# Patient Record
Sex: FEMALE | Race: WHITE | Employment: FULL TIME | URBAN - METROPOLITAN AREA
[De-identification: names, ages, dates, MRNs, and addresses within clinical notes are randomized per-mention and may not be internally consistent; named-entity substitution may affect disease eponyms.]

---

## 2017-07-13 LAB — DIABETIC RETINOPATHY: NORMAL

## 2018-02-04 LAB
BUN BLDV-MCNC: 21 MG/DL
CALCIUM SERPL-MCNC: 9.5 MG/DL
CHLORIDE BLD-SCNC: 99 MMOL/L
CO2: 23 MMOL/L
CREAT SERPL-MCNC: 0.79 MG/DL
GFR CALCULATED: 79
GLUCOSE BLD-MCNC: 319 MG/DL
POTASSIUM SERPL-SCNC: 4 MMOL/L
SODIUM BLD-SCNC: 132 MMOL/L

## 2018-02-20 PROBLEM — M54.50 CHRONIC MIDLINE LOW BACK PAIN WITHOUT SCIATICA: Status: ACTIVE | Noted: 2017-02-02

## 2018-02-20 PROBLEM — G25.81 RESTLESS LEG SYNDROME: Status: ACTIVE | Noted: 2017-02-02

## 2018-02-20 PROBLEM — G89.29 CHRONIC MIDLINE LOW BACK PAIN WITHOUT SCIATICA: Status: ACTIVE | Noted: 2017-02-02

## 2018-02-20 PROBLEM — M79.18 MYOFASCIAL PAIN: Status: ACTIVE | Noted: 2017-02-02

## 2018-02-22 LAB
AVERAGE GLUCOSE: ABNORMAL
HBA1C MFR BLD: 8.4 %
T4 FREE: 1.01
TSH SERPL DL<=0.05 MIU/L-ACNC: 2.13 UIU/ML

## 2018-02-28 ENCOUNTER — OFFICE VISIT (OUTPATIENT)
Dept: ENDOCRINOLOGY | Age: 63
End: 2018-02-28

## 2018-02-28 VITALS
DIASTOLIC BLOOD PRESSURE: 59 MMHG | WEIGHT: 141 LBS | HEIGHT: 65 IN | BODY MASS INDEX: 23.49 KG/M2 | SYSTOLIC BLOOD PRESSURE: 102 MMHG | HEART RATE: 74 BPM

## 2018-02-28 DIAGNOSIS — E78.49 OTHER HYPERLIPIDEMIA: ICD-10-CM

## 2018-02-28 DIAGNOSIS — E53.8 VITAMIN B 12 DEFICIENCY: ICD-10-CM

## 2018-02-28 DIAGNOSIS — E55.9 VITAMIN D DEFICIENCY: ICD-10-CM

## 2018-02-28 DIAGNOSIS — E10.9 TYPE 1 DIABETES MELLITUS WITHOUT COMPLICATION (HCC): ICD-10-CM

## 2018-02-28 DIAGNOSIS — M79.7 FIBROMYALGIA: ICD-10-CM

## 2018-02-28 DIAGNOSIS — M19.90 ARTHRITIS: ICD-10-CM

## 2018-02-28 PROBLEM — E78.5 HYPERLIPIDEMIA: Status: ACTIVE | Noted: 2018-02-28

## 2018-02-28 PROCEDURE — 99215 OFFICE O/P EST HI 40 MIN: CPT | Performed by: INTERNAL MEDICINE

## 2018-02-28 ASSESSMENT — PATIENT HEALTH QUESTIONNAIRE - PHQ9
2. FEELING DOWN, DEPRESSED OR HOPELESS: 0
1. LITTLE INTEREST OR PLEASURE IN DOING THINGS: 0
SUM OF ALL RESPONSES TO PHQ9 QUESTIONS 1 & 2: 0
SUM OF ALL RESPONSES TO PHQ QUESTIONS 1-9: 0

## 2018-02-28 NOTE — PROGRESS NOTES
Maryana Sosa is a 70 y.o. female Pt is seen for follow up of Type 1 Diabetes . Pt was diagnosed with T1Dm in year 1996 she was initaily started on metformin but after a couple of months she was switched to insulin and on pump for years   She got diabetic education in the past .She feels she has been coumnting her carbs . She has hypoglycemia awareness and her threshold is in the 60's   She has Fibromyalgia and follows with dr Jessica Martinez   She has AAA and follows with Dr Judy Ashley     She has been taking Metformin twice a day and denies any stomach upset with it   Happy to be off pump and still sbnacks between meals which messes up control       INTERIM:    Diabetes   She presents for her follow-up diabetic visit. She has type 1 diabetes mellitus. No MedicAlert identification noted. The initial diagnosis of diabetes was made 23 years ago. Her disease course has been stable. Hypoglycemia symptoms include nervousness/anxiousness, speech difficulty, sweats and tremors. Pertinent negatives for diabetes include no foot ulcerations, no polydipsia, no polyphagia, no polyuria, no visual change, no weakness and no weight loss. There are no hypoglycemic complications. Symptoms are stable. Diabetic complications include peripheral neuropathy. Risk factors for coronary artery disease include diabetes mellitus, dyslipidemia and post-menopausal. Current diabetic treatment includes intensive insulin program. She is compliant with treatment most of the time. Her weight is stable. She is following a generally healthy and diabetic diet. Meal planning includes avoidance of concentrated sweets. She has had a previous visit with a dietitian. She participates in exercise weekly. There is no change in her home blood glucose trend. Her breakfast blood glucose is taken between 8-9 am. Her breakfast blood glucose range is generally 130-140 mg/dl.  Her lunch blood glucose is taken between 12-1 pm. Her lunch blood glucose range is generally 140-180 following:  Pulses: normal,   Pinprick: Intact  Proprioception: Intact  Vibration (128 Hz): Intact    No results found for: LABA1C      ASSESSMENT/PLAN:  TYPE 1 DIABETES WITH COMPLICATIONS --UNCONTROLLED 8.1>>7.3 > 7.9>.8.5>>7.9>>7.5>>8.4>>8.5 >>8.5>>8.0  Comprehensively reviewed bloodglucose records, pump download & discussed changes and implications   Reviewed ABC'S of diabetes management A1C <7 Blood pressure <130/80 and cholesterol   victoza gave extreme nausea so not taking it   She was on an insulin pump but is enjoying her. Without the pump and is not interested in going back right now. If she ever has to go back she thinks she would use the 670 G pump by [employer]   She is on lantus 16 units daily she will increase to 17 units and humalog as per CIR 7:1-- increase to  4:1 with dinner ---she will continue with metformin 500 mg bid   She will take 15 units on the day she exercises and non exercising days she will take 16 units of lantus and reduce on exercise days   Hypoglycemia protocol reviewed in detail with patient Patient was advised to carry glucose tablets and also have glucagon emergency kit. Provided with RX for glucagon.  -advised to have annual dilated eye exam normal in July 2015   -last microalb/creat ratio normal 3/2017 -  --eye exam no retinopathy no retinopathy July 2017   --feet exam examianed (Aug 2016 )she has bony spurs in the middle of plantar surface --she saw podiatry in sep 2016 foot exam today revealed normal exam --Vibration normal     --- Thyroid normal on palpation No discreet thyroid nodules identified on exam     Abdominal aortic aneurysm   Stable imaging in 2014   Last imaging in 2007   She is seeing vascular Dr Kang Class     -- stress test and visit with cards with no CAD     Low WBS   Follows with hematology     Hyperlipidemia   Stopped simva 20 mg and FLP tested again in 3 months which came LDL of 47 >>66  She is now 10 mg of simva ldl was 40   She has strong family hx of CAD despite good cholesterol. HYPERTENSION   Low to day she was advised to check at home    she denies feeling dizzy. She was advised if it is low to call back so we can adjust her blood pressure medication     Mitral valve prolapse     POSTMENOPAUSAL   Ob /gyn has been ordering dexa    she doesn't want to take medication for osteoporosis she does have family history of osteoporosis with her mom having osteoporosis. FIBROMYALGIA   Stable follows with Dr Rima Pederson   She had dexa scan done as per pt within last 1-2 years and is normal             Reviewed and/or ordered clinical lab results Yes  Reviewed and/or ordered radiology tests Yes  Reviewed and/or ordered other diagnostic tests Yes  Made a decision to obtain old records Yes  Reviewed and summarized old records Yes      Xochilt An was counseled regarding symptoms of current diagnosis, course and complications of disease if inadequately treated, side effects of medications, diagnosis, treatment options, and prognosis, risks, benefits, complications, and alternatives of treatment, labs, imaging and other studies and treatment targets and goals. She understands instructions and counseling. These diagnosis were discussed and reviewed with the patient including the advantages of drug therapy. She was counseled at this visit on the following: diabetes complication prevention and foot care. Return in about 3 months (around 5/28/2018).

## 2018-03-01 ASSESSMENT — ENCOUNTER SYMPTOMS: VISUAL CHANGE: 0

## 2018-03-15 ENCOUNTER — PATIENT MESSAGE (OUTPATIENT)
Dept: ENDOCRINOLOGY | Age: 63
End: 2018-03-15

## 2018-03-15 RX ORDER — BLOOD-GLUCOSE METER
EACH MISCELLANEOUS
COMMUNITY

## 2018-03-15 NOTE — TELEPHONE ENCOUNTER
From: Mirta Velasquez  To: Dillon Perla MD  Sent: 3/15/2018 11:39 AM EDT  Subject: Prescription Question    I only have 100 test strips. If it would be easier, you can send a prescription to [facility] for a new meter and then call [facility] to fill the prescription for test strips. I just don't want to run out.

## 2018-06-04 ENCOUNTER — OFFICE VISIT (OUTPATIENT)
Dept: ENDOCRINOLOGY | Age: 63
End: 2018-06-04

## 2018-06-04 VITALS
DIASTOLIC BLOOD PRESSURE: 64 MMHG | WEIGHT: 141.4 LBS | HEART RATE: 69 BPM | OXYGEN SATURATION: 99 % | BODY MASS INDEX: 23.56 KG/M2 | SYSTOLIC BLOOD PRESSURE: 126 MMHG | HEIGHT: 65 IN

## 2018-06-04 DIAGNOSIS — E10.42 TYPE 1 DIABETES MELLITUS WITH DIABETIC POLYNEUROPATHY (HCC): Primary | ICD-10-CM

## 2018-06-04 LAB
CREATININE, URINE: NORMAL
MICROALBUMIN/CREAT 24H UR: 8 MG/G{CREAT}
MICROALBUMIN/CREAT UR-RTO: NORMAL

## 2018-06-04 PROCEDURE — 99214 OFFICE O/P EST MOD 30 MIN: CPT | Performed by: INTERNAL MEDICINE

## 2018-06-04 ASSESSMENT — ENCOUNTER SYMPTOMS: VISUAL CHANGE: 0

## 2018-06-20 ENCOUNTER — TELEPHONE (OUTPATIENT)
Dept: ENDOCRINOLOGY | Age: 63
End: 2018-06-20

## 2018-07-05 ENCOUNTER — TELEPHONE (OUTPATIENT)
Dept: ENDOCRINOLOGY | Age: 63
End: 2018-07-05

## 2018-07-17 ENCOUNTER — TELEPHONE (OUTPATIENT)
Dept: ENDOCRINOLOGY | Age: 63
End: 2018-07-17

## 2018-07-17 NOTE — TELEPHONE ENCOUNTER
Dr. Michael Hogue reviewed diabetes log for 7/2 to 7/15. LMOV make the following changes reduce dinner bolus by 1 unit, need to know if she is using carbs : insulin ratio? What ratio, asked pt return phone call.

## 2018-08-09 ENCOUNTER — TELEPHONE (OUTPATIENT)
Dept: ENDOCRINOLOGY | Age: 63
End: 2018-08-09

## 2018-08-09 DIAGNOSIS — E16.2 HYPOGLYCEMIA: ICD-10-CM

## 2018-08-09 DIAGNOSIS — E10.10 TYPE 1 DIABETES MELLITUS WITH KETOACIDOSIS WITHOUT COMA (HCC): Primary | ICD-10-CM

## 2018-08-09 RX ORDER — FLASH GLUCOSE SENSOR
KIT MISCELLANEOUS
Qty: 3 EACH | Refills: 5 | Status: SHIPPED | OUTPATIENT
Start: 2018-08-09 | End: 2019-01-02 | Stop reason: SDUPTHER

## 2018-08-09 RX ORDER — FLASH GLUCOSE SENSOR
KIT MISCELLANEOUS
Qty: 1 DEVICE | Refills: 0 | Status: SHIPPED | OUTPATIENT
Start: 2018-08-09 | End: 2020-11-03

## 2018-08-09 NOTE — TELEPHONE ENCOUNTER
Discussed amado she is ot intersted   Discussed GLP-1 agonist she will think about   Will call in [employer]

## 2018-08-13 ENCOUNTER — TELEPHONE (OUTPATIENT)
Dept: ENDOCRINOLOGY | Age: 63
End: 2018-08-13

## 2018-08-13 NOTE — TELEPHONE ENCOUNTER
Fax from Bozena james for [facility].  Stating to increase the days supply to take full advantage of benefit plan

## 2018-08-16 ENCOUNTER — TELEPHONE (OUTPATIENT)
Dept: ENDOCRINOLOGY | Age: 63
End: 2018-08-16

## 2018-09-11 ENCOUNTER — TELEPHONE (OUTPATIENT)
Dept: ENDOCRINOLOGY | Age: 63
End: 2018-09-11

## 2018-09-25 ENCOUNTER — TELEPHONE (OUTPATIENT)
Dept: ENDOCRINOLOGY | Age: 63
End: 2018-09-25

## 2018-09-25 NOTE — TELEPHONE ENCOUNTER
Pt called and completely out of Humalog thought she had more. She normally uses [facility] but would like it to be sent to [facility] in [place].   # [phone]    FOV 10-8-18   FOV  6-4-18

## 2018-10-01 LAB — DIABETIC RETINOPATHY: NEGATIVE

## 2018-10-09 ENCOUNTER — TELEPHONE (OUTPATIENT)
Dept: ENDOCRINOLOGY | Age: 63
End: 2018-10-09

## 2018-10-12 LAB
ALBUMIN SERPL-MCNC: 4.2 G/DL
ALP BLD-CCNC: 53 U/L
ALT SERPL-CCNC: 11 U/L
ANION GAP SERPL CALCULATED.3IONS-SCNC: 8 MMOL/L
AST SERPL-CCNC: 17 U/L
AVERAGE GLUCOSE: ABNORMAL
BILIRUB SERPL-MCNC: 1 MG/DL (ref 0.1–1.4)
BUN BLDV-MCNC: 17 MG/DL
CALCIUM SERPL-MCNC: 9.5 MG/DL
CHLORIDE BLD-SCNC: 103 MMOL/L
CO2: 27 MMOL/L
CREAT SERPL-MCNC: 0.71 MG/DL
GFR CALCULATED: 89
GLUCOSE BLD-MCNC: 261 MG/DL
HBA1C MFR BLD: 7.1 %
POTASSIUM SERPL-SCNC: 5.1 MMOL/L
SODIUM BLD-SCNC: 138 MMOL/L
TOTAL PROTEIN: 6.4
TSH SERPL DL<=0.05 MIU/L-ACNC: 1.62 UIU/ML
VITAMIN D 25-HYDROXY: 43
VITAMIN D2, 25 HYDROXY: NORMAL
VITAMIN D3,25 HYDROXY: NORMAL

## 2018-10-18 ENCOUNTER — OFFICE VISIT (OUTPATIENT)
Dept: ENDOCRINOLOGY | Age: 63
End: 2018-10-18
Payer: COMMERCIAL

## 2018-10-18 ENCOUNTER — NURSE ONLY (OUTPATIENT)
Dept: ENDOCRINOLOGY | Age: 63
End: 2018-10-18

## 2018-10-18 VITALS
HEIGHT: 65 IN | DIASTOLIC BLOOD PRESSURE: 68 MMHG | WEIGHT: 144.8 LBS | BODY MASS INDEX: 24.12 KG/M2 | OXYGEN SATURATION: 99 % | SYSTOLIC BLOOD PRESSURE: 136 MMHG | HEART RATE: 69 BPM

## 2018-10-18 DIAGNOSIS — E10.9 TYPE 1 DIABETES MELLITUS WITHOUT COMPLICATION (HCC): Primary | ICD-10-CM

## 2018-10-18 DIAGNOSIS — I71.02 AORTIC DISSECTION, ABDOMINAL (HCC): ICD-10-CM

## 2018-10-18 PROCEDURE — 99214 OFFICE O/P EST MOD 30 MIN: CPT | Performed by: INTERNAL MEDICINE

## 2018-10-18 ASSESSMENT — ENCOUNTER SYMPTOMS: VISUAL CHANGE: 0

## 2018-10-18 NOTE — PROGRESS NOTES
ADENOIDECTOMY      TOOTH EXTRACTION      2018    TUBAL LIGATION       Social History     Social History    Marital status:      Spouse name: N/A    Number of children: N/A    Years of education: N/A     Occupational History    Not on file. Social History Main Topics    Smoking status: Never Smoker    Smokeless tobacco: Never Used    Alcohol use No    Drug use: No    Sexual activity: Not on file     Other Topics Concern    Not on file     Social History Narrative    ** Merged History Encounter **          Family History   Problem Relation Age of Onset    Heart Disease Mother     High Blood Pressure Mother     Arthritis Mother     Heart Attack Father     Breast Cancer Sister     High Blood Pressure Brother     Coronary Art Dis Mother     Osteoporosis Mother     Coronary Art Dis Father     High Blood Pressure Father     Emphysema Father     Cancer Sister     Stroke Maternal Grandmother      Current Outpatient Prescriptions   Medication Sig Dispense Refill    insulin lispro (HUMALOG) 100 UNIT/ML pen 8 units with breakfast and lunch, 4 units at dinner 5 pen 3    Continuous Blood Gluc  (FREESTYLE GREGG READER) YANA To check glycemic patterns 1 Device 0    Continuous Blood Gluc Sensor (FREESTYLE GREGG SENSOR SYSTEM) MISC To check glucose pattrens 3 each 5    ONETOUCH DELICA LANCETS FINE MISC Use 6 times per day.  600 each 0    glucose blood VI test strips (ONETOUCH VERIO) strip Check blood sugar 4-6 paul per day 540 each 3    Blood Glucose Monitoring Suppl (ONETOUCH VERIO FLEX SYSTEM) w/Device KIT by Does not apply route      aspirin 81 MG chewable tablet Take 81 mg by mouth      candesartan (ATACAND) 4 MG tablet Take by mouth      vitamin D (CHOLECALCIFEROL) 1000 units TABS tablet Take 1,000 Units by mouth      cyanocobalamin ([facility]S VITAMIN B12) 1000 MCG tablet Take 1,000 mcg by mouth      glucose (GLUTOSE) 40 % GEL Take 15 g by mouth      glucagon (GLUCAGON EMERGENCY)

## 2018-11-26 ENCOUNTER — TELEPHONE (OUTPATIENT)
Dept: ENDOCRINOLOGY | Age: 63
End: 2018-11-26

## 2018-12-12 ENCOUNTER — TELEPHONE (OUTPATIENT)
Dept: ENDOCRINOLOGY | Age: 63
End: 2018-12-12

## 2018-12-12 NOTE — TELEPHONE ENCOUNTER
Dr. Abhay Antoine reviewed diabetes log for 11/14 to 11/27. Patient aware to make the following changes reduce lunch bolus by 1 unit.

## 2019-01-02 RX ORDER — FLASH GLUCOSE SENSOR
KIT MISCELLANEOUS
Qty: 9 EACH | Refills: 5 | Status: SHIPPED | OUTPATIENT
Start: 2019-01-02 | End: 2020-11-03

## 2019-01-04 ENCOUNTER — TELEPHONE (OUTPATIENT)
Dept: ENDOCRINOLOGY | Age: 64
End: 2019-01-04

## 2019-01-17 ENCOUNTER — TELEPHONE (OUTPATIENT)
Dept: ENDOCRINOLOGY | Age: 64
End: 2019-01-17

## 2019-02-01 ENCOUNTER — TELEPHONE (OUTPATIENT)
Dept: ENDOCRINOLOGY | Age: 64
End: 2019-02-01

## 2019-02-07 ENCOUNTER — TELEPHONE (OUTPATIENT)
Dept: ENDOCRINOLOGY | Age: 64
End: 2019-02-07

## 2019-02-13 LAB
AVERAGE GLUCOSE: NORMAL
HBA1C MFR BLD: 8.3 %
TSH SERPL DL<=0.05 MIU/L-ACNC: 1.24 UIU/ML
VITAMIN D 25-HYDROXY: 35.6
VITAMIN D2, 25 HYDROXY: NORMAL
VITAMIN D3,25 HYDROXY: NORMAL

## 2019-02-18 ENCOUNTER — OFFICE VISIT (OUTPATIENT)
Dept: ENDOCRINOLOGY | Age: 64
End: 2019-02-18
Payer: COMMERCIAL

## 2019-02-18 VITALS
BODY MASS INDEX: 24.16 KG/M2 | SYSTOLIC BLOOD PRESSURE: 112 MMHG | OXYGEN SATURATION: 100 % | WEIGHT: 145 LBS | HEART RATE: 70 BPM | DIASTOLIC BLOOD PRESSURE: 60 MMHG | HEIGHT: 65 IN

## 2019-02-18 DIAGNOSIS — I10 ESSENTIAL HYPERTENSION: ICD-10-CM

## 2019-02-18 DIAGNOSIS — E78.2 MIXED HYPERLIPIDEMIA: ICD-10-CM

## 2019-02-18 DIAGNOSIS — E55.9 VITAMIN D DEFICIENCY: ICD-10-CM

## 2019-02-18 PROCEDURE — 95251 CONT GLUC MNTR ANALYSIS I&R: CPT | Performed by: INTERNAL MEDICINE

## 2019-02-18 PROCEDURE — 99214 OFFICE O/P EST MOD 30 MIN: CPT | Performed by: INTERNAL MEDICINE

## 2019-02-18 ASSESSMENT — ENCOUNTER SYMPTOMS: VISUAL CHANGE: 0

## 2019-02-21 ENCOUNTER — TELEPHONE (OUTPATIENT)
Dept: ENDOCRINOLOGY | Age: 64
End: 2019-02-21

## 2019-02-26 ENCOUNTER — TELEPHONE (OUTPATIENT)
Dept: ENDOCRINOLOGY | Age: 64
End: 2019-02-26

## 2019-02-28 ENCOUNTER — TELEPHONE (OUTPATIENT)
Dept: ENDOCRINOLOGY | Age: 64
End: 2019-02-28

## 2019-03-05 ENCOUNTER — TELEPHONE (OUTPATIENT)
Dept: ENDOCRINOLOGY | Age: 64
End: 2019-03-05

## 2019-03-05 RX ORDER — FLASH GLUCOSE SENSOR
KIT MISCELLANEOUS
Qty: 6 EACH | Refills: 3 | Status: SHIPPED | OUTPATIENT
Start: 2019-03-05 | End: 2019-12-19 | Stop reason: SDUPTHER

## 2019-03-19 ENCOUNTER — TELEPHONE (OUTPATIENT)
Dept: ENDOCRINOLOGY | Age: 64
End: 2019-03-19

## 2019-04-09 ENCOUNTER — TELEPHONE (OUTPATIENT)
Dept: ENDOCRINOLOGY | Age: 64
End: 2019-04-09

## 2019-04-09 NOTE — TELEPHONE ENCOUNTER
Dr. Val Rosado reviewed diabetes log for 3/18 to 4/2. Patient aware to make the following changes take meal bolus 10 min before eating & reduce Lantus on exercise days to 10 units as having over nite lows.

## 2019-04-23 ENCOUNTER — TELEPHONE (OUTPATIENT)
Dept: ENDOCRINOLOGY | Age: 64
End: 2019-04-23

## 2019-04-23 NOTE — TELEPHONE ENCOUNTER
Dr. Carroll Goes reviewed diabetes log for 4/1 to 4/14. Patient aware to make the following changes reduce long acting insulin to 11 units, watch for snacks in between meals, take meal bolus 10-15 mins before eating. Pt said she has URI and went into hospital on Sunday for dehydration. Pt said she has not kept a log, but is scanning sensor, asked pt to upload so Dr. Glen Kelley can look at more recent blood sugars.       4/21/19  Blood sugar 343 (H) @

## 2019-04-26 ENCOUNTER — TELEPHONE (OUTPATIENT)
Dept: ENDOCRINOLOGY | Age: 64
End: 2019-04-26

## 2019-04-26 NOTE — TELEPHONE ENCOUNTER
Reviewed her log as she has been running very high she needs to increase her basal ionsulin by 2 units and then when she starts getting better and lower numbers she can go back to her usual dose in a few days based on her fingerstick blood glucose

## 2019-06-03 ENCOUNTER — OFFICE VISIT (OUTPATIENT)
Dept: ENDOCRINOLOGY | Age: 64
End: 2019-06-03
Payer: COMMERCIAL

## 2019-06-03 VITALS
BODY MASS INDEX: 22.99 KG/M2 | SYSTOLIC BLOOD PRESSURE: 120 MMHG | DIASTOLIC BLOOD PRESSURE: 60 MMHG | WEIGHT: 138 LBS | HEIGHT: 65 IN | HEART RATE: 68 BPM | OXYGEN SATURATION: 99 %

## 2019-06-03 DIAGNOSIS — I10 ESSENTIAL HYPERTENSION: ICD-10-CM

## 2019-06-03 DIAGNOSIS — M85.80 OSTEOPENIA, UNSPECIFIED LOCATION: ICD-10-CM

## 2019-06-03 DIAGNOSIS — E78.2 MIXED HYPERLIPIDEMIA: ICD-10-CM

## 2019-06-03 DIAGNOSIS — E55.9 VITAMIN D DEFICIENCY: ICD-10-CM

## 2019-06-03 PROCEDURE — 99215 OFFICE O/P EST HI 40 MIN: CPT | Performed by: INTERNAL MEDICINE

## 2019-06-03 RX ORDER — METFORMIN HYDROCHLORIDE 500 MG/1
500 TABLET, EXTENDED RELEASE ORAL
Qty: 90 TABLET | Refills: 2 | Status: SHIPPED | OUTPATIENT
Start: 2019-06-03 | End: 2020-02-05

## 2019-06-03 ASSESSMENT — ENCOUNTER SYMPTOMS: VISUAL CHANGE: 0

## 2019-06-03 NOTE — PROGRESS NOTES
Silvino Perez is a 75 y.o. female Pt is seen for follow up of Type 1 Diabetes . Pt was diagnosed with T1Dm in year 1996 she was initaily started on metformin but after a couple of months she was switched to insulin and on pump for years   She got diabetic education in the past .She feels she has been coumnting her carbs . She has hypoglycemia awareness and her threshold is in the 60's   She has Fibromyalgia and follows with dr Daniel Gutierrez   She has AAA and follows with Dr Keith Joshi     She has been taking Metformin twice a day and denies any stomach upset with it   Happy to be off pump and still snacks between meals which messes up control       INTERIM:    Diabetes   She presents for her follow-up diabetic visit. She has type 1 diabetes mellitus. No MedicAlert identification noted. The initial diagnosis of diabetes was made 23 years ago. Her disease course has been stable. Hypoglycemia symptoms include nervousness/anxiousness, speech difficulty, sweats and tremors. Pertinent negatives for diabetes include no foot ulcerations, no polydipsia, no polyphagia, no polyuria, no visual change, no weakness and no weight loss. There are no hypoglycemic complications. Symptoms are stable. Diabetic complications include peripheral neuropathy. Risk factors for coronary artery disease include diabetes mellitus, dyslipidemia and post-menopausal. Current diabetic treatment includes intensive insulin program. She is compliant with treatment most of the time. Her weight is stable. She is following a generally healthy and diabetic diet. Meal planning includes avoidance of concentrated sweets. She has had a previous visit with a dietitian. She participates in exercise weekly. There is no change in her home blood glucose trend. Her breakfast blood glucose is taken between 8-9 am. Her breakfast blood glucose range is generally 130-140 mg/dl.  Her lunch blood glucose is taken between 12-1 pm. Her lunch blood glucose range is generally 140-180 mg/dl. An ACE inhibitor/angiotensin II receptor blocker is being taken. She does not see a podiatrist.Eye exam is current. I reviewed her [employer] with her in detail  Met withdiabetes educator in oct 2018   Most of her glycemic excursions are diet related    Weight trend: stable  Prior visit with dietician: yes  Current diet: on average, 3 meals per day  Current exercise: 3 times a week    Has there been any hospitalization, surgery or major illness since the last visit? No   Has there been any new school, family or social problems since the last visit? No  Has there been any new family history of members with diabetes, heart disease, stroke, or endocrine related problems since the last visit?   No    Past Medical History:   Diagnosis Date    Aortic dissection ([address]     Arthritis     Chicken pox     Family history of cancer     Family history of coronary artery disease     Fibromyalgia     H/O cardiovascular stress test     HSV-1 infection     Hyperlipidemia     Hypertension     Infertility, female     Iron deficiency     Osteopenia     Postmenopausal bleeding     Rheumatoid arteritis     Type 1 diabetes (HCC)     Type 1 diabetes mellitus (HCC)     Vitamin B 12 deficiency     Vitamin D deficiency       Patient Active Problem List   Diagnosis    Fibromyalgia    Arthritis    Type 1 diabetes mellitus without complication (HCC)    Vitamin D deficiency    Hyperlipidemia    Vitamin B 12 deficiency    Aortic dissection, abdominal (HCC)    Chronic midline low back pain without sciatica    Cramp of limb    Hypertension    Lung nodule    Myofascial pain    Myopathy    Osteoarthrosis    Osteopenia    Pain in limb    Restless leg syndrome    Right foot injury    Toe fracture, right    Uncontrolled type 1 diabetes mellitus (HCC)     Past Surgical History:   Procedure Laterality Date    APPENDECTOMY      CATARACT REMOVAL Bilateral 08/2018   [facility] DENTAL SURGERY      DENTAL SURGERY  2018    implant     TONSILLECTOMY      TONSILLECTOMY AND ADENOIDECTOMY      TOOTH EXTRACTION      2018    TUBAL LIGATION       Social History     Socioeconomic History    Marital status:      Spouse name: Not on file    Number of children: Not on file    Years of education: Not on file    Highest education level: Not on file   Occupational History    Not on file   Social Needs    Financial resource strain: Not on file    Food insecurity:     Worry: Not on file     Inability: Not on file    Transportation needs:     Medical: Not on file     Non-medical: Not on file   Tobacco Use    Smoking status: Never Smoker    Smokeless tobacco: Never Used   Substance and Sexual Activity    Alcohol use: No    Drug use: No    Sexual activity: Not on file   Lifestyle    Physical activity:     Days per week: Not on file     Minutes per session: Not on file    Stress: Not on file   Relationships    Social connections:     Talks on phone: Not on file     Gets together: Not on file     Attends Quaker service: Not on file     Active member of club or organization: Not on file     Attends meetings of clubs or organizations: Not on file     Relationship status: Not on file    Intimate partner violence:     Fear of current or ex partner: Not on file     Emotionally abused: Not on file     Physically abused: Not on file     Forced sexual activity: Not on file   Other Topics Concern    Not on file   Social History Narrative    ** Merged History Encounter **          Family History   Problem Relation Age of Onset    Heart Disease Mother     High Blood Pressure Mother     Arthritis Mother     Heart Attack Father     Breast Cancer Sister     High Blood Pressure Brother     Coronary Art Dis Mother     Osteoporosis Mother     Coronary Art Dis Father     High Blood Pressure Father     Emphysema Father     Cancer Sister     Stroke Maternal Grandmother      Current Outpatient Medications Medication Sig Dispense Refill    metFORMIN (GLUCOPHAGE-XR) 500 MG extended release tablet Take 1 tablet by mouth daily (with breakfast) 90 tablet 2    Continuous Blood Gluc Sensor (FREESTYLE GREGG 14 DAY SENSOR) [facility] Check glucose ---change every 14 days 6 each 3    metFORMIN (GLUCOPHAGE) 500 MG tablet Take 1 tab PO BID. 180 tablet 1    insulin lispro (HUMALOG) 100 UNIT/ML pen 8 units with breakfast and lunch, 4 units at dinner (Patient taking differently: 7 units with breakfast and lunch, 5 units at dinner) 5 pen 3    Continuous Blood Gluc Sensor ([address] [place] [address]) [facility] To check glucose pattrens 9 each 5    Continuous Blood Gluc  (FREESTYLE GREGG READER) YANA To check glycemic patterns 1 Device 0    Blood Glucose Monitoring Suppl (ONETOUCH VERIO FLEX SYSTEM) w/Device KIT by Does not apply route      aspirin 81 MG chewable tablet Take 81 mg by mouth      vitamin D (CHOLECALCIFEROL) 1000 units TABS tablet Take 1,000 Units by mouth      cyanocobalamin (CVS VITAMIN B12) 1000 MCG tablet Take 1,000 mcg by mouth      glucose (GLUTOSE) 40 % GEL Take 15 g by mouth      glucagon (GLUCAGON EMERGENCY) 1 MG injection To be used in case of severe lows      glucose (GLUTOSE 15) 40 % GEL       insulin glargine (LANTUS) 100 UNIT/ML injection pen lantus  12  units daily      pramipexole (MIRAPEX) 0.25 MG tablet Take 0.25 mg by mouth      simvastatin (ZOCOR) 10 MG tablet        No current facility-administered medications for this visit.       Allergies   Allergen Reactions    Allevess [Capsaicin-Menthol]      Pt denies 10/18/18    Lisinopril Other (See Comments)     Low BP     Morphine      Pt denies 18    Naproxen Sodium Hives    Nitroglycerin      Low blood pressure, pt denies 18     Family Status   Relation Name Status    Mother  Alive    Father      Sister  Alive    Brother  Alive    Sister  Alive    MGM  (Not Specified)       Review of Systems  Constitutional: no fatigue, no fever, no recent weight gain, no recent weight loss, no changes in appetite  Eyes: no eye pain, no change in vision, no eye redness, no eye irritation, no double vision  Ears, nose, throat: no nasal congestion, no sore throat, no earache, no decrease in hearing, no hoarseness, no dry mouth, no sinus problems, no difficulty swallowing, no neck lumps, no dental problems, no mouth sores, no ringing in ears  Pulmonary: no shortness of breath, no wheezing, no dyspnea on exertion, no cough  Cardiovascular: no chest pain, no lower extremity edema, no orthopnea, no intermittent leg claudication, no palpitations  Gastrointestinal: no abdominal pain, no nausea, no vomiting, no diarrhea, no constipation, no dysphagia, no heartburn, no bloating  Genitourinary: no dysuria, no urinary incontinence, no urinary hesitancy, no urinary frequency, no feelings of urinary urgency, no nocturia  Musculoskeletal: no joint swelling, no joint stiffness, no joint pain, no muscle cramps, no muscle pain, no bone pain, no fractures  Integument/Breast: hair loss, but no skin rashes, no skin lesions, no itching, no dry skin, no breast pain, no breast mass, no skin hives, no skin discoloration, no nipple discharge  Neurological: no numbness, no tingling, no weakness, no confusion, no headaches, no dizziness, no fainting, no tremors, no decrease in memory, no balance problems  Psychiatric: no anxiety, no depression, no insomnia, no change in personality, no emotional problems, no stress  Hematologic/Lymphatic: no tendency for easy bleeding, no swollen lymph nodes, no tendency for easy bruising  Immunology: no seasonal allergies, no frequent infections, no frequent illnesses  Endocrine: no temperature intolerance no hirsutism, no hot flashes    OBJECTIVE:  /60   Pulse 68   Ht 5' 5\" (1.651 m)   Wt 138 lb (62.6 kg)   SpO2 99%   BMI 22.96 kg/m²    Wt Readings from Last 3 Encounters:   06/03/19 138 lb (62.6 kg)   02/18/19 145 lb insulin is correct dosing she was advised to reduce the basal insulin to 10 units on exercise days  --She is interested in [facility] THE sensor   Met with diabetes educator oct 2018    She checks her fingerstick blood glucose 4-6 times   She is on lantus 12 units and humalog as per CIR 8:1-- increase to  5:1 with dinner ---she will continue with metformin 500 mg bid   By jose antonio Braden Quintero gave extreme nausea so not taking it   She was on an insulin pump but now on MDI and Does not want to go back on the insulin pump today. Hypoglycemia protocol reviewed in detail with patient Patient was advised to carry glucose tablets and also have glucagon emergency kit. Provided with RX for glucagon.  -advised to have annual dilated /2018-  --eye exam no retinopathy no retinopathy oct 2018   --feet exam examianed ---June 2019     last microalbumin to creatinine ratio was normal in June 2018 with a level of 4.8     --- Thyroid normal on palpation No discreet thyroid nodules identified on exam     Abdominal aortic aneurysm   Stable imaging in 2014   Last imaging in 2017   She is seeing vascular Dr Aníbal Navarro     -- stress test and visit with cards with no CAD     Low WBS   Follows with hematology     Hyperlipidemia   She is now 10 mg of simva ldl was 40   She has strong family hx of CAD despite good cholesterol. HYPERTENSION    well controlled on the current regimen   Continue with current regimen   Mitral valve prolapse     POSTMENOPAUSAL   Ob /gyn has been ordering dexa    she doesn't want to take medication for osteoporosis she does have family history of osteoporosis with her mom having osteoporosis.       FIBROMYALGIA   Stable follows with Dr Anne Laura   She had dexa scan done as per pt within last 1-2 years and is normal       Reviewed and/or ordered clinical lab results Yes  Reviewed and/or ordered radiology tests Yes  Reviewed and/or ordered other diagnostic tests Yes  Made a decision to obtain old records Yes  Reviewed and summarized old records Yes  I spent 50 minutes with patient and more than 50 % of this time was spent discussing and providing counseling and coordinating care of patient's multiple health issues    Tanya Ruby was counseled regarding symptoms of current diagnosis, course and complications of disease if inadequately treated, side effects of medications, diagnosis, treatment options, and prognosis, risks, benefits, complications, and alternatives of treatment, labs, imaging and other studies and treatment targets and goals. She understands instructions and counseling. These diagnosis were discussed and reviewed with the patient including the advantages of drug therapy. She was counseled at this visit on the following: diabetes complication prevention and foot care. Return in about 3 months (around 9/3/2019).

## 2019-06-03 NOTE — LETTER
[facility]  [address]  [address]  [address] [address]  Phone: [phone]  Fax: [phone]    Jose J Thomas MD        Giulia 3, 2019     Patient: Raiza Bal   YOB: 1955           To Whom it May Concern:    Guadalupe Jenkins cannot walk through metal detector due to sensor for diabetes. Please allow patient to fly with all diabetes supplies. If you have any questions or concerns, please don't hesitate to call.     Sincerely,         Jose J Thomas MD

## 2019-06-21 ENCOUNTER — TELEPHONE (OUTPATIENT)
Dept: ENDOCRINOLOGY | Age: 64
End: 2019-06-21

## 2019-06-24 NOTE — TELEPHONE ENCOUNTER
Diabetes Continuous Glucose Monitoring Report         Reason for Study:     - improve diabetic control without risk of hypoglycemia       Current Medication regimen:   Basal bolus regimen     CGMS Report   CGMS data collection was performed on 6/24/2019  Patient provided information on her  diet, activities and insulin dosing  during this period. Data was available for 14 days     Sensor Data Report:   - 12 AM to 6 AM: Overnight blood glucose pattern shows variable pattern with daily variability noted based on dietary and activity level  - 6   AM to 10 AM:  Post breakfast hyper glycemia is noted  --10AM to 5 PM : Occasional hypoglycemia observed during this time.   - 5   PM to 8 PM: post meal hyperglycemia is noted  - Occasional preceding hypoglycemic events reported     Average reading 201 mg/dL     Standard Dev 91mg/dL   % of time <70 mg/dL 6 %   % of time >180 mg/dL 54 %   % of time within range 40 %  Number of hypoglycemia episodes noted: 9     Impression:   CGMS shows variable pattern of glycemic excursion based on dietary excursions as well as activity level     Recommendation:      Patient was advised to make the following changes in her diabetic regimen    Stay on the current basal rate  Avoid taking correction boluses to assess basal insulin requirements  Start taking meal boluses 10 to 15 minutes before eating  Change dinner bolus by increasing by 1 units

## 2019-07-12 NOTE — TELEPHONE ENCOUNTER
[facility] will ship out sensors. Pt aware. She doesn't feel she should have to pay for sensor, pt aware she would need to contact company if she wants to be reimbursed. Spent 16 mins on the phone with [facility].

## 2019-08-23 ENCOUNTER — TELEPHONE (OUTPATIENT)
Dept: ENDOCRINOLOGY | Age: 64
End: 2019-08-23
Payer: COMMERCIAL

## 2019-08-23 DIAGNOSIS — E10.9 TYPE 1 DIABETES MELLITUS WITHOUT COMPLICATION (HCC): Primary | ICD-10-CM

## 2019-08-23 PROCEDURE — 95251 CONT GLUC MNTR ANALYSIS I&R: CPT | Performed by: INTERNAL MEDICINE

## 2019-09-12 ENCOUNTER — TELEPHONE (OUTPATIENT)
Dept: ENDOCRINOLOGY | Age: 64
End: 2019-09-12

## 2019-09-23 ENCOUNTER — OFFICE VISIT (OUTPATIENT)
Dept: ENDOCRINOLOGY | Age: 64
End: 2019-09-23
Payer: COMMERCIAL

## 2019-09-23 VITALS
DIASTOLIC BLOOD PRESSURE: 64 MMHG | BODY MASS INDEX: 22.99 KG/M2 | OXYGEN SATURATION: 100 % | WEIGHT: 138 LBS | HEIGHT: 65 IN | HEART RATE: 68 BPM | SYSTOLIC BLOOD PRESSURE: 126 MMHG

## 2019-09-23 DIAGNOSIS — E10.9 TYPE 1 DIABETES MELLITUS WITHOUT COMPLICATION (HCC): Primary | ICD-10-CM

## 2019-09-23 PROCEDURE — 99215 OFFICE O/P EST HI 40 MIN: CPT | Performed by: INTERNAL MEDICINE

## 2019-09-23 RX ORDER — CANDESARTAN 4 MG/1
4 TABLET ORAL DAILY
COMMUNITY
End: 2022-06-20

## 2019-09-23 ASSESSMENT — ENCOUNTER SYMPTOMS: VISUAL CHANGE: 0

## 2019-09-23 NOTE — PROGRESS NOTES
Alive    Brother  Alive    Sister  Alive    [place]  (Not Specified)       Review of Systems  I have reviewed the review of system questionnaire filled by the patient .   Patient was advised to contact PCP for non endocrine signs and symptoms       OBJECTIVE:  /64   Pulse 68   Ht 5' 5\" (1.651 m)   Wt 138 lb (62.6 kg)   SpO2 100%   BMI 22.96 kg/m²    Wt Readings from Last 3 Encounters:   09/23/19 138 lb (62.6 kg)   06/03/19 138 lb (62.6 kg)   02/18/19 145 lb (65.8 kg)       Constitutional: no acute distress, well appearing and well nourished  Psychiatric: oriented to person, place and time, judgement and insight and normal, recent and remote memory and intact and mood and affect are normal  Skin: skin and subcutaneous tissue is normal without mass, normal turgor  Head and Face: examination of head and face revealed no abnormalities  Eyes: no lid or conjunctival swelling, erythema or discharge, pupils are normal, equal, round  Ears/Nose: external inspection of ears and nose revealed no abnormalities, hearing is grossly normal  Oropharynx/Mouth/Face: lips, tongue and gums are normal with no lesions, the voice quality was normal  Neck: neck is supple and symmetric, with midline trachea and no masses, thyroid is normal  Pulmonary: no increased work of breathing or signs of respiratory distress, lungs are clear to auscultation  Cardiovascular: normal heart rate and rhythm, normal S1 and S2, no murmurs and pedal pulses and 2+ bilaterally, No edema  Musculoskeletal: normal gait and station and exam of the digits and nails are normal  Neurological: normal coordination and normal general cortical function      Lab Results   Component Value Date    LABA1C 8.3 02/13/2019    LABA1C 7.1 10/12/2018    LABA1C 8.4 02/22/2018         ASSESSMENT/PLAN:      TYPE 1 DIABETES WITH COMPLICATIONS --UNCONTROLLED 8.1>>7.3 > 7.9>.8.5>>7.9>>7.5>>8.4>>8.5 >>8.5>>8.0>>7.5>>7.1>>8.3>>7.9>>8.4    Comprehensively reviewed bloodglucose records, pump download & discussed changes and implications   Reviewed ABC'S of diabetes management A1C <7 Blood pressure <130/80 and cholesterol    we discussed Selene again but will not start it at this time as she is planning on her 2-week cruise vacation to [facility]. She will continue to take her meal boluses 10 minutes before she eats she still struggles with that  She continues to have erratic blood glucose control after going over her CGM data with her in great detail   she was advised to reduce the basal insulin to 10 units on exercise days  --She is interested in [facility] THE sensor and had applied for it she has not heard back from them yet  Met with diabetes educator oct 2018      She is on lantus 11 --12 units and humalog as per CIR 8:1-- increase to  5:1 with dinner ---she will continue with metformin 500 mg bid   --victoza gave extreme nausea so not taking it   She was on an insulin pump but now on MDI and Does not want to go back on the insulin pump today. Hypoglycemia protocol reviewed in detail with patient Patient was advised to carry glucose tablets and also have glucagon emergency kit. Provided with RX for glucagon. --eye exam no retinopathy no retinopathy oct 2018   --feet exam examianed ---June 2019     last microalbumin to creatinine ratio was normal in June 2018 with a level of 4.8     --- Thyroid normal on palpation No discreet thyroid nodules identified on exam     Abdominal aortic aneurysm   Stable imaging in 2014   Last imaging in 2017   She is seeing vascular Dr Jigar Bowling     -- stress test and visit with cards with no CAD     Low WBS   Follows with hematology     Hyperlipidemia   She is now 10 mg of simva ldl was 40   She has strong family hx of CAD despite good cholesterol.     HYPERTENSION    well controlled on the current regimen   Continue with current regimen   Mitral valve prolapse     POSTMENOPAUSAL   Ob /gyn has been ordering dexa    she doesn't want to take medication for osteoporosis she does have family history of osteoporosis with her mom having osteoporosis. FIBROMYALGIA   Stable follows with Dr Geoffrey Shrestha   She had dexa scan done as per pt within last 1-2 years and is normal       Reviewed and/or ordered clinical lab results Yes  Reviewed and/or ordered radiology tests Yes  Reviewed and/or ordered other diagnostic tests Yes  Made a decision to obtain old records Yes  Reviewed and summarized old records Yes    I spent  45 minutes with patient and more than 50 % of this time was spent discussing and providing counseling and coordinating care of patient's multiple health issues    Zahra Terrell was counseled regarding symptoms of current diagnosis, course and complications of disease if inadequately treated, side effects of medications, diagnosis, treatment options, and prognosis, risks, benefits, complications, and alternatives of treatment, labs, imaging and other studies and treatment targets and goals. She understands instructions and counseling. These diagnosis were discussed and reviewed with the patient including the advantages of drug therapy. She was counseled at this visit on the following: diabetes complication prevention and foot care. Return in about 3 months (around 12/23/2019).

## 2019-10-09 ENCOUNTER — TELEPHONE (OUTPATIENT)
Dept: ENDOCRINOLOGY | Age: 64
End: 2019-10-09

## 2019-11-13 ENCOUNTER — TELEPHONE (OUTPATIENT)
Dept: ENDOCRINOLOGY | Age: 64
End: 2019-11-13
Payer: COMMERCIAL

## 2019-11-13 DIAGNOSIS — E10.9 DIABETES MELLITUS, LABILE (HCC): Primary | ICD-10-CM

## 2019-11-13 PROCEDURE — 95251 CONT GLUC MNTR ANALYSIS I&R: CPT | Performed by: INTERNAL MEDICINE

## 2019-11-21 ENCOUNTER — TELEPHONE (OUTPATIENT)
Dept: ENDOCRINOLOGY | Age: 64
End: 2019-11-21

## 2019-11-21 DIAGNOSIS — E10.9 DIABETES MELLITUS, LABILE (HCC): Primary | ICD-10-CM

## 2019-11-21 DIAGNOSIS — E10.9 TYPE 1 DIABETES MELLITUS WITHOUT COMPLICATION (HCC): ICD-10-CM

## 2019-11-21 RX ORDER — INSULIN PUMP CONTROLLER
EACH MISCELLANEOUS
Qty: 8 EACH | Refills: 3 | Status: SHIPPED | OUTPATIENT
Start: 2019-11-21 | End: 2020-09-12 | Stop reason: SDUPTHER

## 2019-11-27 ENCOUNTER — TELEPHONE (OUTPATIENT)
Dept: ENDOCRINOLOGY | Age: 64
End: 2019-11-27

## 2019-12-18 ENCOUNTER — TELEPHONE (OUTPATIENT)
Dept: ENDOCRINOLOGY | Age: 64
End: 2019-12-18

## 2019-12-19 RX ORDER — FLASH GLUCOSE SENSOR
KIT MISCELLANEOUS
Qty: 6 EACH | Refills: 3 | Status: SHIPPED | OUTPATIENT
Start: 2019-12-19 | End: 2020-04-22 | Stop reason: SDUPTHER

## 2020-01-03 ENCOUNTER — TELEPHONE (OUTPATIENT)
Dept: ENDOCRINOLOGY | Age: 65
End: 2020-01-03

## 2020-01-03 NOTE — TELEPHONE ENCOUNTER
Doretha foster may want to meet with pt on 1/9 with [facility], she will call pt to see if she is interested. I would have to forward to [facility] to make sure she is OK with 30 min NP appointment.  Doretha will call back if pt is interested in diabetes education

## 2020-01-09 ENCOUNTER — OFFICE VISIT (OUTPATIENT)
Dept: ENDOCRINOLOGY | Age: 65
End: 2020-01-09
Payer: COMMERCIAL

## 2020-01-09 PROCEDURE — 97802 MEDICAL NUTRITION INDIV IN: CPT | Performed by: DIETITIAN, REGISTERED

## 2020-01-09 NOTE — PROGRESS NOTES
Myopathy    Osteoarthrosis    Osteopenia    Pain in limb    Restless leg syndrome    Right foot injury    Toe fracture, right    Uncontrolled type 1 diabetes mellitus (HCC)       Current Outpatient Medications   Medication Sig Dispense Refill    Continuous Blood Gluc Sensor ([facility]STYLE GREGG 14 DAY SENS[facility]) [facility] ---change every 14 days 6 each 3    insulin lispro (HUMALOG) 100 UNIT/ML injection vial upto 60 units per insulin pump 2 vial 3    Insulin Disposable Pump (OMNIPOD DASH 5 PACK) MISC Omnipod Dash 5 pack Qty 8 for 90 days 8 each 3    IRON PO Take 65 mg by mouth      candesartan (ATACAND) 4 MG tablet Take 4 mg by mouth daily      Glucagon (BAQSIMI ONE PACK) 3 MG/DOSE POWD To be used in case of severe hypoglycemia 2 each 3    insulin lispro (HUMALOG) 100 UNIT/ML pen 20  units with each meal 10 pen 0    insulin lispro (HUMALOG KWIKPEN) 100 UNIT/ML pen Per physician instruction 1 pen 0    metFORMIN (GLUCOPHAGE-XR) 500 MG extended release tablet Take 1 tablet by mouth daily (with breakfast) 90 tablet 2    Continuous Blood Gluc Sensor (FREESTYLE GREGG SENSOR SYSTEM) MISC To check glucose pattrens 9 each 5    Continuous Blood Gluc  (FREESTYLE GREGG READER) YANA To check glycemic patterns 1 Device 0    Blood Glucose Monitoring Suppl (ONETOUCH VERIO FLEX SYSTEM) w/Device KIT by Does not apply route      aspirin 81 MG chewable tablet Take 81 mg by mouth      vitamin D (CHOLECALCIFEROL) 1000 units TABS tablet Take 1,000 Units by mouth      cyanocobalamin ([employer] VITAMIN B12) 1000 MCG tablet Take 1,000 mcg by mouth      glucose (GLUTOSE) 40 % GEL Take 15 g by mouth      glucagon (GLUCAGON EMERGENCY) 1 MG injection To be used in case of severe lows      glucose (GLUTOSE 15) 40 % GEL       insulin glargine (LANTUS) 100 UNIT/ML injection pen 11 Units       pramipexole (MIRAPEX) 0.25 MG tablet Take 0.25 mg by mouth      simvastatin (ZOCOR) 10 MG tablet        No current facility-administered medications for this visit. NUTRITION ASSESSMENT    Biochemical Data:    Lab Results   Component Value Date    LABA1C 8.3 02/13/2019     No results found for: EAG    No results found for: CHOL  No results found for: TRIG  No results found for: HDL  No results found for: LDLCALC, LDLCHOLESTEROL  No results found for: LABVLDL, VLDL  No results found for: CHOLHDLRATIO    No results found for: WBC, HGB, HCT, MCV, PLT    Lab Results   Component Value Date    CREATININE 0.71 10/12/2018    BUN 17 10/12/2018     10/12/2018    K 5.1 10/12/2018     10/12/2018    CO2 27 10/12/2018       Diabetes Medications: Yes, uses Omnipod  Knows name and dose of prescribed medications   Knows prescribed schedule for medications  Recent change in medication type/dosage:   Stores  medications properly  Comments:     Monitoring:   Has BG meter: Yes  Testing frequency: multiple times with Damon Luke  Recent results: 34,13,  Pt. Sick currently. Hypoglycemia? Yes    Anthropometric Measurements: Wt:   Wt Readings from Last 3 Encounters:   09/23/19 138 lb (62.6 kg)   06/03/19 138 lb (62.6 kg)   02/18/19 145 lb (65.8 kg)      BMI:   BMI Readings from Last 3 Encounters:   09/23/19 22.96 kg/m²   06/03/19 22.96 kg/m²   02/18/19 24.13 kg/m²     Patient's stated goal weight:   7% Weight loss goal weight:     Physical Activity History:   Physical activity: cardio  Frequency of activity: 3 days weekly  Duration of activity: 60 minutes  Obstacles to activity:     Sleep: poor, short duration    Food and Nutrition History:   Nutrition Awareness/Previous DSMES: yes  Beverage consumption: water-8-16 ozs. , sometimes hot tea  Alcohol consumption: yes  Frequency of Meals Eaten away from home: twice weekly  Food Availability Problems: No  Number of people in household: 2  Usual Food consumption:   3 meals, snacks, not always sure of carb amount on foods     Barriers:   -none          Follow Up Plan:  Will remain available. Contact information provided.     Referring Provider: Jericho Kim MD  Time spent with patient: 45 minutes

## 2020-01-16 LAB
AVERAGE GLUCOSE: NORMAL
HBA1C MFR BLD: 8.8 %
TSH SERPL DL<=0.05 MIU/L-ACNC: 1.89 UIU/ML

## 2020-01-21 ENCOUNTER — TELEPHONE (OUTPATIENT)
Dept: ENDOCRINOLOGY | Age: 65
End: 2020-01-21

## 2020-01-22 ENCOUNTER — OFFICE VISIT (OUTPATIENT)
Dept: ENDOCRINOLOGY | Age: 65
End: 2020-01-22
Payer: COMMERCIAL

## 2020-01-22 VITALS
HEIGHT: 65 IN | OXYGEN SATURATION: 99 % | BODY MASS INDEX: 22.82 KG/M2 | WEIGHT: 137 LBS | SYSTOLIC BLOOD PRESSURE: 124 MMHG | HEART RATE: 61 BPM | DIASTOLIC BLOOD PRESSURE: 64 MMHG

## 2020-01-22 PROCEDURE — 95251 CONT GLUC MNTR ANALYSIS I&R: CPT | Performed by: INTERNAL MEDICINE

## 2020-01-22 PROCEDURE — 99215 OFFICE O/P EST HI 40 MIN: CPT | Performed by: INTERNAL MEDICINE

## 2020-01-22 ASSESSMENT — ENCOUNTER SYMPTOMS: VISUAL CHANGE: 0

## 2020-01-22 NOTE — PROGRESS NOTES
Belia Hill is a 75 y.o. female  seen for follow up of Type 1 Diabetes . Pt was diagnosed with T1Dm in year 1996 she was initaily started on metformin but after a couple of months she was switched to insulin and was on insulin pump for years   She got diabetic education in the past .She feels she has been coumnting her carbs . She has hypoglycemia awareness and her threshold is in the 60's   She has Fibromyalgia and follows with dr Ace Royal although she feels like she does not have fibromyalgia  She has AAA and follows with Dr Terrilyn Denver:    Diabetes   She presents for her follow-up diabetic visit. She has type 1 diabetes mellitus. No MedicAlert identification noted. The initial diagnosis of diabetes was made 23 years ago. Her disease course has been stable. Hypoglycemia symptoms include nervousness/anxiousness, speech difficulty, sweats and tremors. Pertinent negatives for diabetes include no foot ulcerations, no polydipsia, no polyphagia, no polyuria, no visual change, no weakness and no weight loss. There are no hypoglycemic complications. Symptoms are stable. Diabetic complications include peripheral neuropathy. Risk factors for coronary artery disease include diabetes mellitus, dyslipidemia and post-menopausal. Current diabetic treatment includes intensive insulin program. She is compliant with treatment most of the time. Her weight is stable. She is following a generally healthy and diabetic diet. Meal planning includes avoidance of concentrated sweets. She has had a previous visit with a dietitian. She participates in exercise weekly. There is no change in her home blood glucose trend. Her breakfast blood glucose is taken between 8-9 am. Her breakfast blood glucose range is generally 130-140 mg/dl. Her lunch blood glucose is taken between 12-1 pm. Her lunch blood glucose range is generally 140-180 mg/dl. An ACE inhibitor/angiotensin II receptor blocker is being taken.  She does not see a contact PCP for non endocrine signs and symptoms       OBJECTIVE:  /64   Pulse 61   Ht 5' 5\" (1.651 m)   Wt 137 lb (62.1 kg)   SpO2 99%   BMI 22.80 kg/m²    Wt Readings from Last 3 Encounters:   01/22/20 137 lb (62.1 kg)   09/23/19 138 lb (62.6 kg)   06/03/19 138 lb (62.6 kg)       Constitutional: no acute distress, well appearing, well nourished  Psychiatric: oriented to person, place and time, judgement, insight and normal, recent and remote memory and intact and mood, affect are normal  Skin: skin and subcutaneous tissue is normal without mass,   Head and Face: examination of head and face revealed no abnormalities  Eyes: no lid or conjunctival swelling, no erythema or discharge, pupils are normal,   Ears/Nose: external inspection of ears and nose revealed no abnormalities, hearing is grossly normal  Oropharynx/Mouth/Face: lips, tongue and gums are normal with no lesions, the voice quality was normal  Neck: neck is supple and symmetric, with midline trachea and no masses, thyroid is normal    Pulmonary: no increased work of breathing or signs of respiratory distress, lungs are clear to auscultation  Cardiovascular: normal heart rate and rhythm, normal S1 and S2,   Musculoskeletal: normal gait and station,   Neurological: normal coordination, normal general cortical function        Lab Results   Component Value Date    LABA1C 8.8 01/16/2020    LABA1C 8.3 02/13/2019    LABA1C 7.1 10/12/2018         ASSESSMENT/PLAN:      TYPE 1 DIABETES WITH COMPLICATIONS --UNCONTROLLED 8.1>>7.3 > 7.9>.8.5>>7.9>>7.5>>8.4>>8.5 >>8.5>>8.0>>7.5>>7.1>>8.3>>7.9>>8.4>>8.8    --A1c has worsened since the last visit but she is only been on the pump for the last 2 weeks   I reviewed her pump download and the freestyle brain continuous glucose sensing and made appropriate changes and reducing her midnight basal rate to 0.6 as she had some fasting glucose values in the 70s and 80s.   She had one episode of severe hypoglycemia which was after a meal bolus. She will continue to take her meal boluses 10 minutes before she eats she still struggles with that  She was advised to avoid snacks at bedtime especially if the blood sugars are already high  -  Met with diabetes educator oct 2018      ----she will continue with metformin 500 mg bid   --victoza gave extreme nausea so not taking it    Hypoglycemia protocol reviewed in detail with patient Patient was advised to carry glucose tablets and also have glucagon emergency kit. Provided with RX for glucagon. --eye exam no retinopathy no retinopathy oct 2019   --feet exam examianed ---June 2019     last microalbumin to creatinine ratio was normal in June 2018 with a level of 4.8     --- Thyroid normal on palpation No discreet thyroid nodules identified on exam     Abdominal aortic aneurysm   Stable imaging in 2014   Last imaging in 2017   She is seeing vascular Dr Marita Chen     -- stress test and visit with cards with no CAD     Low WBS   Follows with hematology     Hyperlipidemia   She is now 10 mg of simva ldl was 40   She has strong family hx of CAD despite good cholesterol. HYPERTENSION    well controlled on the current regimen   Continue with current regimen   Mitral valve prolapse     POSTMENOPAUSAL   Ob /gyn has been ordering dexa    she doesn't want to take medication for osteoporosis she does have family history of osteoporosis with her mom having osteoporosis.       FIBROMYALGIA   Stable follows with Dr Airam Fuentes   She had dexa scan done as per pt within last 1-2 years and is normal       Reviewed and/or ordered clinical lab results Yes  Reviewed and/or ordered radiology tests Yes  Reviewed and/or ordered other diagnostic tests Yes  Made a decision to obtain old records Yes  Reviewed and summarized old records Yes    I spent  40+ minutes with patient and more than 50 % of this time was spent discussing and providing counseling and coordinating care of patient's multiple health

## 2020-02-05 RX ORDER — METFORMIN HYDROCHLORIDE 500 MG/1
TABLET, EXTENDED RELEASE ORAL
Qty: 90 TABLET | Refills: 4 | Status: SHIPPED | OUTPATIENT
Start: 2020-02-05 | End: 2020-12-10 | Stop reason: SDUPTHER

## 2020-02-07 ENCOUNTER — TELEPHONE (OUTPATIENT)
Dept: ENDOCRINOLOGY | Age: 65
End: 2020-02-07

## 2020-02-11 ENCOUNTER — TELEPHONE (OUTPATIENT)
Dept: ENDOCRINOLOGY | Age: 65
End: 2020-02-11

## 2020-02-11 NOTE — LETTER
[facility]  [address] [place] [address] [id number]  [address] [place] [address]  Phone: [phone]  Fax: [phone]    Jericho Kim MD        February 11, 2020     Patient: Sapna Cousin   YOB: 1955           To Whom it May Concern:    Annamarie Villaseñor is under my care for Type 1 diabetes. Please allow Annamarie Villaseñor to fly with all necessary diabetes medications, needles, and supplies for omnipod insulin pump/freestyle brain CGM. If you have any questions or concerns, please don't hesitate to call.     Sincerely,         Jericho Kim MD

## 2020-03-17 ENCOUNTER — TELEPHONE (OUTPATIENT)
Dept: ENDOCRINOLOGY | Age: 65
End: 2020-03-17

## 2020-03-19 ENCOUNTER — TELEPHONE (OUTPATIENT)
Dept: ENDOCRINOLOGY | Age: 65
End: 2020-03-19

## 2020-03-30 ENCOUNTER — TELEPHONE (OUTPATIENT)
Dept: ENDOCRINOLOGY | Age: 65
End: 2020-03-30
Payer: COMMERCIAL

## 2020-03-30 PROCEDURE — 95251 CONT GLUC MNTR ANALYSIS I&R: CPT | Performed by: INTERNAL MEDICINE

## 2020-04-22 ENCOUNTER — TELEPHONE (OUTPATIENT)
Dept: ENDOCRINOLOGY | Age: 65
End: 2020-04-22

## 2020-04-22 ENCOUNTER — VIRTUAL VISIT (OUTPATIENT)
Dept: ENDOCRINOLOGY | Age: 65
End: 2020-04-22
Payer: COMMERCIAL

## 2020-04-22 PROCEDURE — 3052F HG A1C>EQUAL 8.0%<EQUAL 9.0%: CPT | Performed by: INTERNAL MEDICINE

## 2020-04-22 PROCEDURE — 99214 OFFICE O/P EST MOD 30 MIN: CPT | Performed by: INTERNAL MEDICINE

## 2020-04-22 ASSESSMENT — ENCOUNTER SYMPTOMS: VISUAL CHANGE: 0

## 2020-04-22 NOTE — PROGRESS NOTES
140-180 mg/dl. An ACE inhibitor/angiotensin II receptor blocker is being taken. She does not see a podiatrist.Eye exam is current. She still continues to have diet related hyperglycemia     Weight trend: stable  Prior visit with dietician: yes  Current diet: on average, 3 meals per day  Current exercise: 3 times a week    Has there been any hospitalization, surgery or major illness since the last visit? No   Has there been any new school, family or social problems since the last visit? No  Has there been any new family history of members with diabetes, heart disease, stroke, or endocrine related problems since the last visit?   No    Past Medical History:   Diagnosis Date    Aortic dissection (HCC)     Arthritis     Chicken pox     Family history of cancer     Family history of coronary artery disease     Fibromyalgia     H/O cardiovascular stress test     HSV-1 infection     Hyperlipidemia     Hypertension     Infertility, female     Iron deficiency     Osteopenia     Postmenopausal bleeding     Rheumatoid arteritis (HCC)     Type 1 diabetes (HCC)     Type 1 diabetes mellitus ([facility] [address]     Vitamin B 12 deficiency     Vitamin D deficiency       Patient Active Problem List   Diagnosis    Fibromyalgia    Arthritis    Type 1 diabetes mellitus without complication (HCC)    Vitamin D deficiency    Hyperlipidemia    Vitamin B 12 deficiency    Aortic dissection, abdominal (HCC)    Chronic midline low back pain without sciatica    Cramp of limb    Hypertension    Lung nodule    Myofascial pain    Myopathy    Osteoarthrosis    Osteopenia    Pain in limb    Restless leg syndrome    Right foot injury    Toe fracture, right    Uncontrolled type 1 diabetes mellitus (HCC)     Past Surgical History:   Procedure Laterality Date    APPENDECTOMY      CATARACT REMOVAL Bilateral 08/2018    DENTAL SURGERY      DENTAL SURGERY  2018    implant     TONSILLECTOMY      TONSILLECTOMY AND denies  Cardiovascular  Denies any chest pain denies any palpitations currently  Gastrointestinal  Denies any nausea ,vomiting ,constipation or diarrhea  Hematological/lymphatic  Denies any blood clot denies or any painful lymph nodes  Genitourinary  Denies any frequent urination denies any nighttime urination  Female  Denies any menstrual cycle changes, patient is postmenopausal  Skin  Denies any acne denies any changes in facial body hair denies any non healing wounds Musculoskeletal   denies any joint or bone pain ,denies any muscle weakness ,denies any new fracture  Endocrine  Denies any excessive thirst denies any excessive heat intolerance or cold intolerance , denies any nipple discharge ,denies any increase in shoe size . OBJECTIVE:  There were no vitals taken for this visit.    Wt Readings from Last 3 Encounters:   01/22/20 137 lb (62.1 kg)   09/23/19 138 lb (62.6 kg)   06/03/19 138 lb (62.6 kg)       Constitutional: no acute distress, well appearing and well nourished  Psychiatric: oriented to person, place and time, judgement and insight and normal, recent and remote memory intact and mood and affect are normal  Skin: skin and subcutaneous tissue is normal without visible mass,   Head and Face: visual inspection  of head and face revealed no abnormalities  Eyes: visual inspection showed no lid or conjunctival swelling, erythema or discharge, pupils are normal, equal, round  Ears/Nose: external inspection of ears and nose revealed no abnormalities, hearing is grossly normal  Oropharynx/Mouth/Face: lips, tongue and gums appear  normal with no lesions, the voice quality was normal  Neck: neck appears symmetric, with no visible masses,   Pulmonary: no increased work of breathing or signs of respiratory distress,  Musculoskeletal: normal on inspection    Neurological: normal coordination and normal general cortical function        Lab Results   Component Value Date    LABA1C 8.4 04/20/2020    LABA1C 8.8

## 2020-04-22 NOTE — TELEPHONE ENCOUNTER
Diabetes Continuous Glucose Monitoring Report         Reason for Study:     - improve diabetic control without risk of hypoglycemia       Current Medication regimen:        CGMS Report   CGMS insertion date   CGMS data collection was performed on   Patient provided information on her  diet, activities and insulin dosing  during this period. Data was available for   days     Sensor Data Report:   - 12 AM to 6 AM: Overnight blood glucose pattern shows  - 6   AM to 10 AM:  Post breakfast  is noted  --10AM to 5 PM :  hypoglycemia observed during this time.   - 5   PM to 8 PM: Post meal  is noted       Average reading  mg/dL     Standard Dev    mg/dL   % of time <70 mg/dL  %   % of time >180  mg/dL %   % of time within range %  Number of hypoglycemia episodes noted: 0     Impression:   CGMS shows overnite glucose      Recommendation:      Patient was advised to make the following changes in her diabetic regimen    Reduce MN__6 am  basal rate to 0.6 ( 0.65)  Keep 6am to 4 pm ) 0.7  4 pm to MN   To

## 2020-05-13 ENCOUNTER — TELEPHONE (OUTPATIENT)
Dept: ENDOCRINOLOGY | Age: 65
End: 2020-05-13

## 2020-05-13 NOTE — TELEPHONE ENCOUNTER
Returned patient's call at this time. Patient has questions regarding insurance changes. Patient will remain on Cobra for the next two months and will be switching to Medicare. Has concerns about Omnipod and insulins being covered. Notified patient that omnipod should be covered through Medicare but if not we will assist her in doing the paperwork as needed. No other questions or concerns at this time.

## 2020-05-13 NOTE — TELEPHONE ENCOUNTER
vm from pt stating she lost her job and will be going on Medicare and wants to know what insulin pump would be covered?   CB# [phone]

## 2020-05-28 RX ORDER — FLASH GLUCOSE SENSOR
KIT MISCELLANEOUS
Qty: 6 EACH | Refills: 0 | Status: SHIPPED | OUTPATIENT
Start: 2020-05-28 | End: 2020-11-03

## 2020-06-12 ENCOUNTER — TELEPHONE (OUTPATIENT)
Dept: ENDOCRINOLOGY | Age: 65
End: 2020-06-12

## 2020-06-12 NOTE — TELEPHONE ENCOUNTER
Please advise patient to reduce her 6 AM basal rate from 0.7-0.675 and send me freestyle brain/Dexcom data along with pump data in 2 weeks

## 2020-07-02 ENCOUNTER — TELEPHONE (OUTPATIENT)
Dept: ENDOCRINOLOGY | Age: 65
End: 2020-07-02
Payer: COMMERCIAL

## 2020-07-02 PROCEDURE — 95251 CONT GLUC MNTR ANALYSIS I&R: CPT | Performed by: INTERNAL MEDICINE

## 2020-07-02 NOTE — TELEPHONE ENCOUNTER
Pt called and wants to know if she can bring her meter to be read @ the [facility] today?  She states she's a few days late bringing it in already and was hoping to bring it in today before the Holiday wknd  # [phone]

## 2020-07-22 ENCOUNTER — VIRTUAL VISIT (OUTPATIENT)
Dept: ENDOCRINOLOGY | Age: 65
End: 2020-07-22
Payer: COMMERCIAL

## 2020-07-22 PROCEDURE — 3052F HG A1C>EQUAL 8.0%<EQUAL 9.0%: CPT | Performed by: INTERNAL MEDICINE

## 2020-07-22 PROCEDURE — 99214 OFFICE O/P EST MOD 30 MIN: CPT | Performed by: INTERNAL MEDICINE

## 2020-07-22 ASSESSMENT — ENCOUNTER SYMPTOMS: VISUAL CHANGE: 0

## 2020-07-22 NOTE — PROGRESS NOTES
Yaya Wilcox is a 77 y.o. female  seen for follow up of  Uncontrolled  Type 1 Diabetes . Pt was diagnosed with T1Dm in year 1996 she was initaily started on metformin but after a couple of months she was switched to insulin and was on insulin pump for years and then switched to basal bolus regimen as she didn't want a pump with tubing   She got diabetic education in the past .She feels she has been coumnting her carbs accurately  . She has hypoglycemia awareness and her threshold is in the 60's   She has Fibromyalgia and follows with dr Tab Cervantes although she feels like she does not have fibromyalgia  She has AAA and follows with Dr Leatha Blackwell:    Diabetes   She presents for her follow-up diabetic visit. She has type 1 diabetes mellitus. No MedicAlert identification noted. The initial diagnosis of diabetes was made 23 years ago. Her disease course has been stable. Hypoglycemia symptoms include nervousness/anxiousness, speech difficulty, sweats and tremors. Pertinent negatives for diabetes include no foot ulcerations, no polydipsia, no polyphagia, no polyuria, no visual change, no weakness and no weight loss. There are no hypoglycemic complications. Symptoms are stable. Diabetic complications include peripheral neuropathy. Risk factors for coronary artery disease include diabetes mellitus, dyslipidemia and post-menopausal. Current diabetic treatment includes intensive insulin program. She is compliant with treatment most of the time. Her weight is stable. She is following a generally healthy and diabetic diet. Meal planning includes avoidance of concentrated sweets. She has had a previous visit with a dietitian. She participates in exercise weekly. There is no change in her home blood glucose trend. Her breakfast blood glucose is taken between 8-9 am. Her breakfast blood glucose range is generally 130-140 mg/dl.  Her lunch blood glucose is taken between 12-1 pm. Her lunch blood glucose range is generally 140-180 mg/dl. An ACE inhibitor/angiotensin II receptor blocker is being taken. She does not see a podiatrist.Eye exam is current. She still continues to have diet related hyperglycemia   She is having some postprandial bolus lows     Weight trend: stable  Prior visit with dietician: yes  Current diet: on average, 3 meals per day  Current exercise: 3 times a week    Has there been any hospitalization, surgery or major illness since the last visit? No   Has there been any new school, family or social problems since the last visit? No  Has there been any new family history of members with diabetes, heart disease, stroke, or endocrine related problems since the last visit?   No    Past Medical History:   Diagnosis Date    Aortic dissection (HCC)     Arthritis     Chicken pox     Family history of cancer     Family history of coronary artery disease     Fibromyalgia     H/O cardiovascular stress test     HSV-1 infection     Hyperlipidemia     Hypertension     Infertility, female     Iron deficiency     Osteopenia     Postmenopausal bleeding     Rheumatoid arteritis (HCC)     Type 1 diabetes (HCC)     Type 1 diabetes mellitus ([address]     Vitamin B 12 deficiency     Vitamin D deficiency       Patient Active Problem List   Diagnosis    Fibromyalgia    Arthritis    Type 1 diabetes mellitus without complication (HCC)    Vitamin D deficiency    Hyperlipidemia    Vitamin B 12 deficiency    Aortic dissection, abdominal (HCC)    Chronic midline low back pain without sciatica    Cramp of limb    Hypertension    Lung nodule    Myofascial pain    Myopathy    Osteoarthrosis    Osteopenia    Pain in limb    Restless leg syndrome    Right foot injury    Toe fracture, right    Uncontrolled type 1 diabetes mellitus ([address]     Past Surgical History:   Procedure Laterality Date    APPENDECTOMY      CATARACT REMOVAL Bilateral 08/2018    DENTAL SURGERY      DENTAL SURGERY  2018    implant     TONSILLECTOMY      TONSILLECTOMY AND ADENOIDECTOMY      TOOTH EXTRACTION      2018    TUBAL LIGATION       Social History     Socioeconomic History    Marital status:      Spouse name: Not on file    Number of children: Not on file    Years of education: Not on file    Highest education level: Not on file   Occupational History    Not on file   Social Needs    Financial resource strain: Not on file    Food insecurity     Worry: Not on file     Inability: Not on file    Transportation needs     Medical: Not on file     Non-medical: Not on file   Tobacco Use    Smoking status: Never Smoker    Smokeless tobacco: Never Used   Substance and Sexual Activity    Alcohol use: No    Drug use: No    Sexual activity: Not on file   Lifestyle    Physical activity     Days per week: Not on file     Minutes per session: Not on file    Stress: Not on file   Relationships    Social connections     Talks on phone: Not on file     Gets together: Not on file     Attends Congregation service: Not on file     Active member of club or organization: Not on file     Attends meetings of clubs or organizations: Not on file     Relationship status: Not on file    Intimate partner violence     Fear of current or ex partner: Not on file     Emotionally abused: Not on file     Physically abused: Not on file     Forced sexual activity: Not on file   Other Topics Concern    Not on file   Social History Narrative    ** Merged History Encounter **          Family History   Problem Relation Age of Onset    Heart Disease Mother     High Blood Pressure Mother     Arthritis Mother     Heart Attack Father     Breast Cancer Sister     High Blood Pressure Brother     Coronary Art Dis Mother     Osteoporosis Mother     Coronary Art Dis Father     High Blood Pressure Father     Emphysema Father     Cancer Sister     Stroke Maternal Grandmother      Current Outpatient Medications   Medication Sig Dispense Refill    Emergencies Act, 1135 waiver authority and the Coronavirus Preparedness and Response Supplemental Appropriations Act, this Virtual  Visit was conducted, with patient's consent, to reduce the patient's risk of exposure to COVID-19 and provide care for  patient. Services were provided through a video synchronous discussion virtually to substitute for in-person clinic visit. Patient's location : home     Patient provided verbal consent to use the video visit. I spent  minutes on this call conducting an interview, performing a limited exam by video and educating the patient on my assessment plan. Total time spent :25 + min     Fani Ponce was counseled regarding symptoms of current diagnosis, course and complications of disease if inadequately treated, side effects of medications, diagnosis, treatment options, and prognosis, risks, benefits, complications, and alternatives of treatment, labs, imaging and other studies and treatment targets and goals. She understands instructions and counseling. These diagnosis were discussed and reviewed with the patient including the advantages of drug therapy. She was counseled at this visit on the following: diabetes complication prevention and foot care. Return in about 3 months (around 10/22/2020).      \

## 2020-07-28 ENCOUNTER — TELEPHONE (OUTPATIENT)
Dept: ENDOCRINOLOGY | Age: 65
End: 2020-07-28

## 2020-07-29 NOTE — TELEPHONE ENCOUNTER
Please advise patient to change her carbohydrate to insulin ratio Starting 10 am to 13:1 As she is having Hypoglycemia after each meal

## 2020-09-14 ENCOUNTER — TELEPHONE (OUTPATIENT)
Dept: ENT CLINIC | Age: 65
End: 2020-09-14

## 2020-09-14 RX ORDER — INSULIN PUMP CONTROLLER
EACH MISCELLANEOUS
Qty: 8 EACH | Refills: 3 | Status: SHIPPED | OUTPATIENT
Start: 2020-09-14 | End: 2020-09-14 | Stop reason: SDUPTHER

## 2020-09-14 RX ORDER — INSULIN PUMP CONTROLLER
EACH MISCELLANEOUS
Qty: 10 EACH | Refills: 5 | Status: SHIPPED | OUTPATIENT
Start: 2020-09-14 | End: 2021-03-17

## 2020-09-14 NOTE — TELEPHONE ENCOUNTER
Christophe calling from [facility]      She is asking for directions on the patient's rx  omnipod   Please advise    This new telephone call was started,  But I added this message to the one that was already opened

## 2020-09-15 ENCOUNTER — NURSE ONLY (OUTPATIENT)
Dept: ENDOCRINOLOGY | Age: 65
End: 2020-09-15

## 2020-09-15 NOTE — PROGRESS NOTES
Patient presents for teaching on the Dexcom sensor and placement. Teaching complete. Sensor placed on abdomen with no issues.  set up as well as char on phone. Dexcom also linked through [facility] clarity. No other questions or concerns at this time.

## 2020-10-15 ENCOUNTER — TELEPHONE (OUTPATIENT)
Dept: ENT CLINIC | Age: 65
End: 2020-10-15

## 2020-10-16 NOTE — TELEPHONE ENCOUNTER
Reached out to [facility] and they stated they have been trying to contact patient for a verbal to send the shipment. They received no answer. Left message for patient to be on the look out for their call or to call [employer] [employer] shipment. Also sent patient message through [address]

## 2020-10-21 ENCOUNTER — TELEPHONE (OUTPATIENT)
Dept: ENDOCRINOLOGY | Age: 65
End: 2020-10-21

## 2020-10-21 NOTE — TELEPHONE ENCOUNTER
Patient is asking if you want any blood work before her next appt. It looks like the lab orders in April have been drawn already through [id number]

## 2020-10-26 ENCOUNTER — TELEPHONE (OUTPATIENT)
Dept: ENDOCRINOLOGY | Age: 65
End: 2020-10-26

## 2020-11-03 ENCOUNTER — OFFICE VISIT (OUTPATIENT)
Dept: ENDOCRINOLOGY | Age: 65
End: 2020-11-03
Payer: MEDICARE

## 2020-11-03 VITALS
HEIGHT: 65 IN | SYSTOLIC BLOOD PRESSURE: 136 MMHG | HEART RATE: 68 BPM | BODY MASS INDEX: 23.82 KG/M2 | TEMPERATURE: 97.4 F | WEIGHT: 143 LBS | RESPIRATION RATE: 16 BRPM | DIASTOLIC BLOOD PRESSURE: 65 MMHG

## 2020-11-03 PROCEDURE — 95251 CONT GLUC MNTR ANALYSIS I&R: CPT | Performed by: INTERNAL MEDICINE

## 2020-11-03 PROCEDURE — 99214 OFFICE O/P EST MOD 30 MIN: CPT | Performed by: INTERNAL MEDICINE

## 2020-11-03 PROCEDURE — 3051F HG A1C>EQUAL 7.0%<8.0%: CPT | Performed by: INTERNAL MEDICINE

## 2020-11-03 ASSESSMENT — ENCOUNTER SYMPTOMS: VISUAL CHANGE: 0

## 2020-11-03 NOTE — PROGRESS NOTES
Jo Gallo is a 77 y.o. female  seen for follow up of  Uncontrolled  Type 1 Diabetes . Pt was diagnosed with T1Dm in year 1996 she was initaily started on metformin but after a couple of months she was switched to insulin and was on insulin pump for years and then switched to basal bolus regimen as she didn't want a pump with tubing   She got diabetic education in the past .She feels she has been coumnting her carbs accurately  . She has hypoglycemia awareness and her threshold is in the 60's   She has Fibromyalgia and follows with dr Aime Parr although she feels like she does not have fibromyalgia  She has AAA and follows with Dr Rafi Mack:    Diabetes   She presents for her follow-up diabetic visit. She has type 1 diabetes mellitus. No MedicAlert identification noted. The initial diagnosis of diabetes was made 23 years ago. Her disease course has been stable. Hypoglycemia symptoms include nervousness/anxiousness, speech difficulty, sweats and tremors. Pertinent negatives for diabetes include no foot ulcerations, no polydipsia, no polyphagia, no polyuria, no visual change, no weakness and no weight loss. There are no hypoglycemic complications. Symptoms are stable. Diabetic complications include peripheral neuropathy. Risk factors for coronary artery disease include diabetes mellitus, dyslipidemia and post-menopausal. Current diabetic treatment includes intensive insulin program. She is compliant with treatment most of the time. Her weight is stable. She is following a generally healthy and diabetic diet. Meal planning includes avoidance of concentrated sweets. She has had a previous visit with a dietitian. She participates in exercise weekly. There is no change in her home blood glucose trend. Her breakfast blood glucose is taken between 8-9 am. Her breakfast blood glucose range is generally 130-140 mg/dl.  Her lunch blood glucose is taken between 12-1 pm. Her lunch blood glucose range is generally 140-180 mg/dl. An ACE inhibitor/angiotensin II receptor blocker is being taken. She does not see a podiatrist.Eye exam is current. She still continues to have diet related hyperglycemia   She is having some postprandial bolus lows but still struggles with taking the meal boluses 10 minutes prior to eating    Weight trend: stable  Prior visit with dietician: yes  Current diet: on average, 3 meals per day  Current exercise: 3 times a week    Has there been any hospitalization, surgery or major illness since the last visit? No   Has there been any new school, family or social problems since the last visit? No  Has there been any new family history of members with diabetes, heart disease, stroke, or endocrine related problems since the last visit?   No    Past Medical History:   Diagnosis Date    [facility]     Arthritis     Chicken pox     Family history of cancer     Family history of coronary artery disease     Fibromyalgia     H/O cardiovascular stress test     HSV-1 infection     Hyperlipidemia     Hypertension     Infertility, female     Iron deficiency     Osteopenia     Postmenopausal bleeding     Rheumatoid arteritis (HCC)     Type 1 diabetes (HCC)     Type 1 diabetes mellitus ([address]     Vitamin B 12 deficiency     Vitamin D deficiency       Patient Active Problem List   Diagnosis    Fibromyalgia    Arthritis    Type 1 diabetes mellitus without complication (HCC)    Vitamin D deficiency    Hyperlipidemia    Vitamin B 12 deficiency    Aortic dissection, abdominal (HCC)    Chronic midline low back pain without sciatica    Cramp of limb    Hypertension    Lung nodule    Myofascial pain    Myopathy    Osteoarthrosis    Osteopenia    Pain in limb    Restless leg syndrome    Right foot injury    Toe fracture, right    Uncontrolled type 1 diabetes mellitus (HCC)     Past Surgical History:   Procedure Laterality Date    APPENDECTOMY      CATARACT REMOVAL Bilateral 08/2018    DENTAL SURGERY      DENTAL SURGERY  2018    implant     TONSILLECTOMY      TONSILLECTOMY AND ADENOIDECTOMY      TOOTH EXTRACTION      2018    TUBAL LIGATION       Social History     Socioeconomic History    Marital status:      Spouse name: Not on file    Number of children: Not on file    Years of education: Not on file    Highest education level: Not on file   Occupational History    Not on file   Social Needs    Financial resource strain: Not on file    Food insecurity     Worry: Not on file     Inability: Not on file    Transportation needs     Medical: Not on file     Non-medical: Not on file   Tobacco Use    Smoking status: Never Smoker    Smokeless tobacco: Never Used   Substance and Sexual Activity    Alcohol use: No    Drug use: No    Sexual activity: Not on file   Lifestyle    Physical activity     Days per week: Not on file     Minutes per session: Not on file    Stress: Not on file   Relationships    Social connections     Talks on phone: Not on file     Gets together: Not on file     Attends Anabaptism service: Not on file     Active member of club or organization: Not on file     Attends meetings of clubs or organizations: Not on file     Relationship status: Not on file    Intimate partner violence     Fear of current or ex partner: Not on file     Emotionally abused: Not on file     Physically abused: Not on file     Forced sexual activity: Not on file   Other Topics Concern    Not on file   Social History Narrative    ** Merged History Encounter **          Family History   Problem Relation Age of Onset    Heart Disease Mother     High Blood Pressure Mother     Arthritis Mother     Heart Attack Father     Breast Cancer Sister     High Blood Pressure Brother     Coronary Art Dis Mother     Osteoporosis Mother     Coronary Art Dis Father     High Blood Pressure Father     Emphysema Father     Cancer Sister     Stroke Maternal Grandmother Current Outpatient Medications   Medication Sig Dispense Refill    Insulin Disposable Pump (OMNIPOD DASH 5 PACK PODS) MISC Change every 3 days. 10 each 5    diclofenac 1 % CREA Place onto the skin as needed for Pain      insulin lispro (HUMALOG) 100 UNIT/ML injection vial upto 100 units per insulin pump 3 vial 3    metFORMIN (GLUCOPHAGE-XR) 500 MG extended release tablet TAKE 1 TABLET DAILY WITH BREAKFAST 90 tablet 4    IRON PO Take 65 mg by mouth      candesartan (ATACAND) 4 MG tablet Take 4 mg by mouth daily      Glucagon (BAQSIMI ONE PACK) 3 MG/DOSE POWD To be used in case of severe hypoglycemia 2 each 3    Blood Glucose Monitoring Suppl (ONETOUCH VERIO FLEX SYSTEM) w/Device KIT by Does not apply route      aspirin 81 MG chewable tablet Take 81 mg by mouth      vitamin D (CHOLECALCIFEROL) 1000 units TABS tablet Take 1,000 Units by mouth      cyanocobalamin ([employer] VITAMIN B12) 1000 MCG tablet Take 1,000 mcg by mouth      pramipexole (MIRAPEX) 0.25 MG tablet Take 0.5 mg by mouth Pt takes 0.5 mg daily      simvastatin (ZOCOR) 10 MG tablet        No current facility-administered medications for this visit. Allergies   Allergen Reactions    Allevess [Capsaicin-Menthol]      Pt denies 10/18/18    Lisinopril Other (See Comments)     Low BP     Morphine      Pt denies 18    Naproxen Sodium Hives    Nitroglycerin      Low blood pressure, pt denies 18     Family Status   Relation Name Status    Mother  Alive    Father      Sister  Alive    Brother  Alive    Sister  Alive    MGM  (Not Specified)       Review of Systems  I have reviewed the review of system questionnaire filled by the patient .   Patient was advised to contact PCP for non endocrine signs and symptoms       OBJECTIVE:  /65   Pulse 68   Temp 97.4 °F (36.3 °C)   Resp 16   Ht 5' 5\" (1.651 m)   Wt 143 lb (64.9 kg)   BMI 23.80 kg/m²    Wt Readings from Last 3 Encounters:   20 143 lb (64.9 kg) 01/22/20 137 lb (62.1 kg)   09/23/19 138 lb (62.6 kg)     Constitutional: no acute distress, well appearing, well nourished  Psychiatric: oriented to person, place and time, judgement, insight and normal, recent and remote memory and intact and mood, affect are normal  Skin: skin and subcutaneous tissue is normal without mass,   Head and Face: examination of head and face revealed no abnormalities  Eyes: no lid or conjunctival swelling, no erythema or discharge, pupils are normal,   Ears/Nose: external inspection of ears and nose revealed no abnormalities, hearing is grossly normal  Oropharynx/Mouth/Face: lips, tongue and gums are normal with no lesions, the voice quality was normal  Neck: neck is supple and symmetric, with midline trachea and no masses, thyroid is normal    Pulmonary: no increased work of breathing or signs of respiratory distress, lungs are clear to auscultation  Cardiovascular: normal heart rate and rhythm, normal S1 and S2,   Musculoskeletal: normal gait and station,   Neurological: normal coordination, normal general cortical function      Lab Results   Component Value Date    LABA1C 7.9 10/22/2020    LABA1C 8.4 04/20/2020    LABA1C 8.8 01/16/2020         ASSESSMENT/PLAN:      TYPE 1 DIABETES WITH COMPLICATIONS --UNCONTROLLED 8.1>>7.3 > 7.9>.8.5>>7.9>>7.5>>8.4>>8.5 >>8.5>>8.0>>7.5>>7.1>>8.3>>7.9>>8.4>>8.8>>8.4>>8.0   I reviewed her pump download and the Dexcom download in detail with her and made appropriate changes  She was again advised to take her meal boluses 10 minutes before she eats she still struggles with that  She was advised to avoid snacks at bedtime especially if the blood sugars are already high     Met with diabetes educator oct 2018      ----she will continue with metformin 500 mg bid   --victoza gave extreme nausea so not taking it        ---Hypoglycemia protocol reviewed in detail with patient Patient was advised to carry glucose tablets and also have glucagon emergency kit.Provided with RX for glucagon. --eye exam no retinopathy no retinopathy oct 2019   --feet exam examianed ---June 2019     last microalbumin to creatinine ratio was normal in June 2018 with a level of 4.8     --- Thyroid normal on palpation No discreet thyroid nodules identified on exam     Abdominal aortic aneurysm   Stable imaging in 2014   Last imaging in 2017   She is seeing vascular Dr Nael Martin     -- stress test and visit with cards with no CAD     Low WBS   Follows with hematology     Hyperlipidemia   She is now 10 mg of simva ldl was 40   She has strong family hx of CAD despite good cholesterol. HYPERTENSION    well controlled on the current regimen   Continue with current regimen   Mitral valve prolapse     POSTMENOPAUSAL   Ob /gyn has been ordering dexa    she doesn't want to take medication for osteoporosis she does have family history of osteoporosis with her mom having osteoporosis. FIBROMYALGIA   Stable follows with Dr Dee Vasques   She had dexa scan done as per pt within last 1-2 years and is normal       Reviewed and/or ordered clinical lab results Yes  Reviewed and/or ordered radiology tests Yes  Reviewed and/or ordered other diagnostic tests Yes  Made a decision to obtain old records Yes  Reviewed and summarized old records Yes    René Irving was counseled regarding symptoms of current diagnosis, course and complications of disease if inadequately treated, side effects of medications, diagnosis, treatment options, and prognosis, risks, benefits, complications, and alternatives of treatment, labs, imaging and other studies and treatment targets and goals. She understands instructions and counseling. These diagnosis were discussed and reviewed with the patient including the advantages of drug therapy. She was counseled at this visit on the following: diabetes complication prevention and foot care.     I spent  25 + minutes with patient and more than 50 % of this time was spent discussing and providing counseling and coordinating care of patient's multiple health issues    Return in about 3 months (around 2/3/2021). Diabetes Continuous Glucose Monitoring Report         Reason for Study:     - improve diabetic control without risk of hypoglycemia       Current Medication regimen:   omnipod     CGMS Report     CGMS data collection was performed on nov 3 , 2020   Patient provided information on her  diet, activities and insulin dosing  during this period. Data was available for 14  days     Sensor Data Report:   - 12 AM to 6 AM: Overnight blood glucose pattern shows  - 6   AM to 10 AM:  Post breakfast  is noted  --10AM to 5 PM :  hypoglycemia observed during this time.   - 5   PM to 8 PM: Post meal  is noted       Average reading  171mg/dL     Standard Dev  69  mg/dL   % of time <70 mg/dL  4%   % of time >180  mg/dL 41%   % of time within range 55%  Number of hypoglycemia episodes noted: 0     Impression:   CGMS shows overnite stable glycemia   Most lows after boluses which were either over calculation or excessive physical activity related     Recommendation:      Patient was advised to make the following changes in her diabetic regimen  Start taking meal boluses 10 min before eating   We discussed CGM S data in detail with the patient her basal rate seems adequate

## 2020-12-14 ENCOUNTER — TELEPHONE (OUTPATIENT)
Dept: ENT CLINIC | Age: 65
End: 2020-12-14

## 2020-12-14 RX ORDER — METFORMIN HYDROCHLORIDE 500 MG/1
TABLET, EXTENDED RELEASE ORAL
Qty: 30 TABLET | Refills: 3 | Status: SHIPPED | OUTPATIENT
Start: 2020-12-14 | End: 2021-12-17

## 2021-02-09 ENCOUNTER — VIRTUAL VISIT (OUTPATIENT)
Dept: ENDOCRINOLOGY | Age: 66
End: 2021-02-09
Payer: MEDICARE

## 2021-02-09 DIAGNOSIS — E78.2 MIXED HYPERLIPIDEMIA: ICD-10-CM

## 2021-02-09 DIAGNOSIS — I71.02 AORTIC DISSECTION, ABDOMINAL (HCC): ICD-10-CM

## 2021-02-09 DIAGNOSIS — E10.649 UNCONTROLLED TYPE 1 DIABETES MELLITUS WITH HYPOGLYCEMIA WITHOUT COMA (HCC): Primary | ICD-10-CM

## 2021-02-09 DIAGNOSIS — I10 ESSENTIAL HYPERTENSION: ICD-10-CM

## 2021-02-09 PROCEDURE — 99442 PR PHYS/QHP TELEPHONE EVALUATION 11-20 MIN: CPT | Performed by: INTERNAL MEDICINE

## 2021-02-09 PROCEDURE — 95251 CONT GLUC MNTR ANALYSIS I&R: CPT | Performed by: INTERNAL MEDICINE

## 2021-02-09 ASSESSMENT — ENCOUNTER SYMPTOMS: VISUAL CHANGE: 0

## 2021-02-09 NOTE — PROGRESS NOTES
Oli Murphy is a 77 y.o. female  seen for follow up of  Uncontrolled  Type 1 Diabetes . Pt was diagnosed with T1Dm in year 1996 she was initaily started on metformin but after a couple of months she was switched to insulin and was on insulin pump for years and then switched to basal bolus regimen as she didn't want a pump with tubing   She got diabetic education in the past .She feels she has been coumnting her carbs accurately  . She has hypoglycemia awareness and her threshold is in the 60's   She has Fibromyalgia and follows with dr Herlinda Cartwright although she feels like she does not have fibromyalgia  She has AAA and follows with Dr Falguni Warren:    Diabetes  She presents for her follow-up diabetic visit. She has type 1 diabetes mellitus. No MedicAlert identification noted. The initial diagnosis of diabetes was made 23 years ago. Her disease course has been stable. Hypoglycemia symptoms include nervousness/anxiousness, speech difficulty, sweats and tremors. Pertinent negatives for diabetes include no foot ulcerations, no polydipsia, no polyphagia, no polyuria, no visual change, no weakness and no weight loss. There are no hypoglycemic complications. Symptoms are stable. Diabetic complications include peripheral neuropathy. Risk factors for coronary artery disease include diabetes mellitus, dyslipidemia and post-menopausal. Current diabetic treatment includes intensive insulin program. She is compliant with treatment most of the time. Her weight is stable. She is following a generally healthy and diabetic diet. Meal planning includes avoidance of concentrated sweets. She has had a previous visit with a dietitian. She participates in exercise weekly. There is no change in her home blood glucose trend. Her breakfast blood glucose is taken between 8-9 am. Her breakfast blood glucose range is generally 130-140 mg/dl.  Her lunch blood glucose is taken between 12-1 pm. Her lunch blood glucose range is generally 140-180 mg/dl. An ACE inhibitor/angiotensin II receptor blocker is being taken. She does not see a podiatrist.Eye exam is current. She still continues to have diet related hyperglycemia   She is having postprandial hyperglycemia but still struggles with taking the meal boluses 10 minutes prior to eating    Weight trend: stable  Prior visit with dietician: yes  Current diet: on average, 3 meals per day  Current exercise: 3 times a week    Has there been any hospitalization, surgery or major illness since the last visit? No   Has there been any new school, family or social problems since the last visit? No  Has there been any new family history of members with diabetes, heart disease, stroke, or endocrine related problems since the last visit?   No    Past Medical History:   Diagnosis Date    Aortic dissection ([address]     Arthritis     Chicken pox     Family history of cancer     Family history of coronary artery disease     Fibromyalgia     H/O cardiovascular stress test     HSV-1 infection     Hyperlipidemia     Hypertension     Infertility, female     Iron deficiency     Osteopenia     Postmenopausal bleeding     Rheumatoid arteritis (HCC)     Type 1 diabetes (HCC)     Type 1 diabetes mellitus ([address]     Vitamin B 12 deficiency     Vitamin D deficiency       Patient Active Problem List   Diagnosis    Fibromyalgia    Arthritis    Type 1 diabetes mellitus without complication (HCC)    Vitamin D deficiency    Hyperlipidemia    Vitamin B 12 deficiency    Aortic dissection, abdominal (HCC)    Chronic midline low back pain without sciatica    Cramp of limb    Hypertension    Lung nodule    Myofascial pain    Myopathy    Osteoarthrosis    Osteopenia    Pain in limb    Restless leg syndrome    Right foot injury    Toe fracture, right    Uncontrolled type 1 diabetes mellitus (HCC)     Past Surgical History:   Procedure Laterality Date    APPENDECTOMY      CATARACT REMOVAL Bilateral 08/2018    DENTAL SURGERY      DENTAL SURGERY  2018    implant     TONSILLECTOMY      TONSILLECTOMY AND ADENOIDECTOMY      TOOTH EXTRACTION      2018    TUBAL LIGATION       Social History     Socioeconomic History    Marital status:      Spouse name: Not on file    Number of children: Not on file    Years of education: Not on file    Highest education level: Not on file   Occupational History    Not on file   Social Needs    Financial resource strain: Not on file    Food insecurity     Worry: Not on file     Inability: Not on file    Transportation needs     Medical: Not on file     Non-medical: Not on file   Tobacco Use    Smoking status: Never Smoker    Smokeless tobacco: Never Used   Substance and Sexual Activity    Alcohol use: Yes     Comment: occasional beer    Drug use: No    Sexual activity: Not on file   Lifestyle    Physical activity     Days per week: Not on file     Minutes per session: Not on file    Stress: Not on file   Relationships    Social connections     Talks on phone: Not on file     Gets together: Not on file     Attends Muslim service: Not on file     Active member of club or organization: Not on file     Attends meetings of clubs or organizations: Not on file     Relationship status: Not on file    Intimate partner violence     Fear of current or ex partner: Not on file     Emotionally abused: Not on file     Physically abused: Not on file     Forced sexual activity: Not on file   Other Topics Concern    Not on file   Social History Narrative    ** Merged History Encounter **          Family History   Problem Relation Age of Onset    Heart Disease Mother     High Blood Pressure Mother     Arthritis Mother     Heart Attack Father     Breast Cancer Sister     High Blood Pressure Brother     Coronary Art Dis Mother     Osteoporosis Mother     Coronary Art Dis Father     High Blood Pressure Father     Emphysema Father     Cancer Sister     Stroke Maternal Grandmother      Current Outpatient Medications   Medication Sig Dispense Refill    metFORMIN (GLUCOPHAGE-XR) 500 MG extended release tablet Take 1 tablet by mouth daily 30 tablet 3    insulin lispro (HUMALOG) 100 UNIT/ML injection vial upto 100 units per insulin pump 3 vial 3    Insulin Disposable Pump (OMNIPOD DASH 5 PACK PODS) MISC Change every 3 days. 10 each 5    diclofenac 1 % CREA Place onto the skin as needed for Pain      IRON PO Take 65 mg by mouth      candesartan (ATACAND) 4 MG tablet Take 4 mg by mouth daily      Glucagon (BAQSIMI ONE PACK) 3 MG/DOSE POWD To be used in case of severe hypoglycemia 2 each 3    Blood Glucose Monitoring Suppl (ONETOUCH VERIO FLEX SYSTEM) w/Device KIT by Does not apply route      aspirin 81 MG chewable tablet Take 81 mg by mouth      vitamin D (CHOLECALCIFEROL) 1000 units TABS tablet Take 1,000 Units by mouth      cyanocobalamin (CVS VITAMIN B12) 1000 MCG tablet Take 1,000 mcg by mouth      pramipexole (MIRAPEX) 0.25 MG tablet Take 0.5 mg by mouth Pt takes 0.5 mg daily      simvastatin (ZOCOR) 10 MG tablet        No current facility-administered medications for this visit. Allergies   Allergen Reactions    Allevess [Capsaicin-Menthol]      Pt denies 10/18/18    Lisinopril Other (See Comments)     Low BP     Morphine      Pt denies 18    Naproxen Sodium Hives    Nitroglycerin      Low blood pressure, pt denies 18     Family Status   Relation Name Status    Mother  Alive    Father      Sister  Alive    Brother  Alive    Sister  Alive    MGM  (Not Specified)         OBJECTIVE:  There were no vitals taken for this visit. Wt Readings from Last 3 Encounters:   20 143 lb (64.9 kg)   20 137 lb (62.1 kg)   19 138 lb (62.6 kg)       Patient is  alert oriented to time ,place and person. Both recent and remote memory intact .   Patient has weighed themselves in the last few  weeks and it has been stable         Lab Results   Component Value Date    LABA1C 8.5 02/03/2021    LABA1C 7.9 10/22/2020    LABA1C 8.4 04/20/2020         ASSESSMENT/PLAN:      Uncontrolled TYPE 1 DIABETES WITH COMPLICATIONS -- 1.6>>9.8 > 7.9>.8.5>>7.9>>7.5>>8.4>>8.5 >>8.5>>8.0>>7.5>>7.1>>8.3>>7.9>>8.4>>8.8>>8.4>>8.0>>8.5   control has been worsening, she is very frustrated as she was having issues with the Dexcom sensor and transmitter  She has already contacted the  and is getting a new transmitter  I reviewed her pump download and the Dexcom download in detail with her and made appropriate changes  She was again advised to take her meal boluses 10 minutes before she eats she still struggles with that   She was advised to avoid snacks at bedtime especially if the blood sugars are already high     Met with diabetes educator oct 2018      ----she will continue with metformin 500 mg bid   --victoza gave extreme nausea so not taking it        ---Hypoglycemia protocol reviewed in detail with patient Patient was advised to carry glucose tablets and also have glucagon emergency kit. Provided with RX for glucagon. --eye exam no retinopathy no retinopathy oct 2019   --feet exam examianed ---June 2019     last microalbumin to creatinine ratio was normal in June 2018 with a level of 4.8     --- Thyroid normal on palpation No discreet thyroid nodules identified on exam     Abdominal aortic aneurysm   Stable imaging in 2014   Last imaging in 2017   She is seeing vascular Dr Elizabeth Sin     -- stress test and visit with cards with no CAD     Low WBS   Follows with hematology     Hyperlipidemia   She is now 10 mg of simva ldl was 40   She has strong family hx of CAD despite good cholesterol.     HYPERTENSION    well controlled on the current regimen   Continue with current regimen   Mitral valve prolapse     POSTMENOPAUSAL   Ob /gyn has been ordering dexa    she doesn't want to take medication for osteoporosis she does have family history of osteoporosis with PM to 8 PM: Post meal  is noted       Average reading 162 mg/dL     Standard Dev  63  mg/dL   % of time <70 mg/dL  4%   % of time >180  mg/dL 33%   % of time within range  63%  Number of hypoglycemia episodes noted: 0     Impression:   CGMS shows overnite stable glycemia   Most lows after boluses which were either over calculation or excessive physical activity related     Recommendation:      Patient was advised to make the following changes in her diabetic regimen  Start taking meal boluses 10 min before eating   I have advised her to reduce her basal rates specifically the one prior to dinnertime so that she does not have any low numbers that barrs  her from taking her meal boluses 10 minutes prior to eating she verbalized understanding

## 2021-02-19 ENCOUNTER — TELEPHONE (OUTPATIENT)
Dept: ENDOCRINOLOGY | Age: 66
End: 2021-02-19

## 2021-02-19 DIAGNOSIS — E10.649 UNCONTROLLED TYPE 1 DIABETES MELLITUS WITH HYPOGLYCEMIA WITHOUT COMA (HCC): Primary | ICD-10-CM

## 2021-02-19 RX ORDER — BLOOD SUGAR DIAGNOSTIC
STRIP MISCELLANEOUS
Qty: 200 EACH | Refills: 5 | Status: SHIPPED | OUTPATIENT
Start: 2021-02-19 | End: 2022-06-13

## 2021-02-19 NOTE — TELEPHONE ENCOUNTER
PT called back and made her follow up appt. She wants a call to let her know if Dr. Raven Husain is getting her reading down loads. She also needs to have One Touch verio test strips called into [facility] on [address].

## 2021-02-19 NOTE — TELEPHONE ENCOUNTER
Returned patients call. Left VM to call back to schedule a 3 month follow up. Patient does not need to speak to the nurse, she can schedule with any .

## 2021-02-23 NOTE — TELEPHONE ENCOUNTER
Patient aware.      Her current carb to insulin ratios settings:    12a-3a: 13:1  10a-3p: 13.1:1  3p-12a: 11:1

## 2021-03-16 DIAGNOSIS — E10.9 TYPE 1 DIABETES MELLITUS WITHOUT COMPLICATION (HCC): ICD-10-CM

## 2021-03-17 RX ORDER — INSULIN PUMP CONTROLLER
EACH MISCELLANEOUS
Qty: 1 EACH | Refills: 4 | Status: SHIPPED | OUTPATIENT
Start: 2021-03-17 | End: 2021-03-22

## 2021-03-20 DIAGNOSIS — E10.9 TYPE 1 DIABETES MELLITUS WITHOUT COMPLICATION (HCC): ICD-10-CM

## 2021-03-22 RX ORDER — INSULIN PUMP CONTROLLER
EACH MISCELLANEOUS
Qty: 10 EACH | Refills: 4 | Status: SHIPPED | OUTPATIENT
Start: 2021-03-22 | End: 2021-08-31

## 2021-03-22 NOTE — TELEPHONE ENCOUNTER
NOV- 05/2021          Requested Prescriptions     Pending Prescriptions Disp Refills    Insulin Disposable Pump (OMNIPOD DASH 5 PACK PODS) MISC [Pharmacy Med Name: OMNIPOD DASH 5 PACK POD]  4     Sig: CHANGE EVERY 3 DAYS

## 2021-03-29 ENCOUNTER — TELEPHONE (OUTPATIENT)
Dept: ENDOCRINOLOGY | Age: 66
End: 2021-03-29
Payer: MEDICARE

## 2021-03-29 DIAGNOSIS — E10.649 UNCONTROLLED TYPE 1 DIABETES MELLITUS WITH HYPOGLYCEMIA WITHOUT COMA (HCC): Primary | ICD-10-CM

## 2021-03-29 PROCEDURE — 95251 CONT GLUC MNTR ANALYSIS I&R: CPT | Performed by: INTERNAL MEDICINE

## 2021-03-30 NOTE — TELEPHONE ENCOUNTER
Diabetes Continuous Glucose Monitoring Report         Reason for Study:     - improve diabetic control without risk of hypoglycemia       Current Medication regimen:      OmniPod insulin pump  CGMS Report     CGMS data collection was performed on March 29, 2021  Patient provided information on her  diet, activities and insulin dosing  during this period. Data was available for 14 days     Sensor Data Report:   - 12 AM to 6 AM: Overnight blood glucose pattern shows fluctuations in glucose  - 6   AM to 10 AM:  Post breakfast hyperglycemia is noted  --10AM to 5 PM : No hypoglycemia observed during this time.   - 5   PM to 8 PM: Post meal hyperglycemia is noted       Average reading 161 mg/dL     Standard Dev 75 mg/dL   % of time <70 mg/dL 5 %   % of time >180  mg/dL 21%   % of time within range 64%  Number of hypoglycemia episodes noted: 3-4     Impression:   CGMS shows wide fluctuation in glucose most likely related to snacking and eating     Recommendation:      Patient was advised to make the following changes in her diabetic regimen  Take meal boluses 10 minutes prior to eating  Do not take any corrective boluses after dinner late night boluses are causing low glucose

## 2021-04-05 ENCOUNTER — TELEPHONE (OUTPATIENT)
Dept: ENDOCRINOLOGY | Age: 66
End: 2021-04-05

## 2021-04-05 NOTE — TELEPHONE ENCOUNTER
PT would like to have a call from Milton Ernst regarding the message that was sent to her last week on her My Chart account.

## 2021-04-06 ENCOUNTER — NURSE ONLY (OUTPATIENT)
Dept: ENDOCRINOLOGY | Age: 66
End: 2021-04-06

## 2021-04-06 DIAGNOSIS — E10.9 TYPE 1 DIABETES MELLITUS WITHOUT COMPLICATION (HCC): Primary | ICD-10-CM

## 2021-04-06 NOTE — TELEPHONE ENCOUNTER
Fax from [address] [place] [address] regarding a third party rejection for the [employer] 5 pack pod.   The patients insurance has changed to Medicare and the Omnipod now needs a PA

## 2021-04-07 NOTE — TELEPHONE ENCOUNTER
PA submitted today and approved:    Farooq Merritt Box: [id number] - PA Case ID: [id number] Need help?  Call us at [phone]   Outcome   Approvedtoday   PA Case: [id number], Status: Approved, Coverage Starts on: 1/6/2021 12:00:00 AM, Coverage Ends on: 4/7/2022 12:00:00 AM.

## 2021-05-10 ENCOUNTER — TELEPHONE (OUTPATIENT)
Dept: ENDOCRINOLOGY | Age: 66
End: 2021-05-10

## 2021-05-10 NOTE — TELEPHONE ENCOUNTER
Fax back from [facility] w/ chart notes  We have rec'd a referral for the pt and require additional info to process the order.   Most recent chart notes, letter of attestation -[facility] form [facility]

## 2021-05-17 ENCOUNTER — TELEPHONE (OUTPATIENT)
Dept: ENDOCRINOLOGY | Age: 66
End: 2021-05-17

## 2021-05-17 NOTE — TELEPHONE ENCOUNTER
PT would like to know if a prescription for her senors has been faxed to [facility]. Call and advise.

## 2021-05-18 NOTE — TELEPHONE ENCOUNTER
PT called and stated that our office needs to call [employer] to have her senors renewed. Pt would like to have a call regarding this issue.

## 2021-05-19 NOTE — TELEPHONE ENCOUNTER
PA submitted for Dexcom transmitters. PA states no PA needed. Transmitters already covered. Sent [employer] rep an email to let her know.

## 2021-05-19 NOTE — TELEPHONE ENCOUNTER
Call from [facility] stating the Prior Auth needs to be done also for the Transmitter.  The other PA was done for the sensors only and approved    CB# [phone] for Albina Ya from [facility]

## 2021-05-19 NOTE — TELEPHONE ENCOUNTER
Left VM for patient that prescription has been sent. A prior authorization was required and completed as well as approved and sent to [facility]. Patient to call office back with any questions or concerns.

## 2021-06-17 ENCOUNTER — TELEPHONE (OUTPATIENT)
Dept: ENDOCRINOLOGY | Age: 66
End: 2021-06-17

## 2021-06-17 NOTE — TELEPHONE ENCOUNTER
Pt phoned letting you know she got her labs done. She is also wanting to know if you wanting her to come into [facility] on Monday to get her meter downloaded since her appt next week is Virtual.  Please call pt and let her know.

## 2021-06-17 NOTE — TELEPHONE ENCOUNTER
Left VM for patient that she can come in on Monday for a pump and meter download and I will put her on the schedule. Patient to come in anytime between 8 and 4.

## 2021-06-21 ENCOUNTER — NURSE ONLY (OUTPATIENT)
Dept: ENDOCRINOLOGY | Age: 66
End: 2021-06-21

## 2021-06-21 NOTE — PROGRESS NOTES
Patient presents for a meter and pump download for her upcoming visit on Thursday. Omnipod Dash downloaded with no issues.

## 2021-06-24 ENCOUNTER — VIRTUAL VISIT (OUTPATIENT)
Dept: ENDOCRINOLOGY | Age: 66
End: 2021-06-24
Payer: MEDICARE

## 2021-06-24 DIAGNOSIS — E10.65 UNCONTROLLED TYPE 1 DIABETES MELLITUS WITH HYPERGLYCEMIA (HCC): Primary | ICD-10-CM

## 2021-06-24 DIAGNOSIS — I10 ESSENTIAL HYPERTENSION: ICD-10-CM

## 2021-06-24 DIAGNOSIS — I71.02 AORTIC DISSECTION, ABDOMINAL (HCC): ICD-10-CM

## 2021-06-24 DIAGNOSIS — E78.2 MIXED HYPERLIPIDEMIA: ICD-10-CM

## 2021-06-24 PROCEDURE — 99443 PR PHYS/QHP TELEPHONE EVALUATION 21-30 MIN: CPT | Performed by: INTERNAL MEDICINE

## 2021-06-24 ASSESSMENT — ENCOUNTER SYMPTOMS: VISUAL CHANGE: 0

## 2021-06-24 NOTE — PROGRESS NOTES
Lora Raymond is a 74 y.o. female  seen for follow up of  Uncontrolled  Type 1 Diabetes . Pt was diagnosed with T1Dm in year 1996 she was initaily started on metformin but after a couple of months she was switched to insulin and was on insulin pump for years and then switched to basal bolus regimen as she didn't want a pump with tubing   She got diabetic education in the past .She feels she has been coumnting her carbs accurately  . She has hypoglycemia awareness and her threshold is in the 60's   She has Fibromyalgia and follows with dr Rose Mary Amaya although she feels like she does not have fibromyalgia  She has AAA and follows with Dr Hayden January:    Diabetes  She presents for her follow-up diabetic visit. She has type 1 diabetes mellitus. No MedicAlert identification noted. The initial diagnosis of diabetes was made 23 years ago. Her disease course has been stable. Hypoglycemia symptoms include nervousness/anxiousness, speech difficulty, sweats and tremors. Pertinent negatives for diabetes include no foot ulcerations, no polydipsia, no polyphagia, no polyuria, no visual change, no weakness and no weight loss. There are no hypoglycemic complications. Symptoms are stable. Diabetic complications include peripheral neuropathy. Risk factors for coronary artery disease include diabetes mellitus, dyslipidemia and post-menopausal. Current diabetic treatment includes intensive insulin program. She is compliant with treatment most of the time. Her weight is stable. She is following a generally healthy and diabetic diet. Meal planning includes avoidance of concentrated sweets. She has had a previous visit with a dietitian. She participates in exercise weekly. There is no change in her home blood glucose trend. Her breakfast blood glucose is taken between 8-9 am. Her breakfast blood glucose range is generally 130-140 mg/dl.  Her lunch blood glucose is taken between 12-1 pm. Her lunch blood glucose range is generally CATARACT REMOVAL Bilateral 08/2018    DENTAL SURGERY      DENTAL SURGERY  2018    implant     TONSILLECTOMY      TONSILLECTOMY AND ADENOIDECTOMY      TOOTH EXTRACTION      2018    TUBAL LIGATION       Social History     Socioeconomic History    Marital status:      Spouse name: Not on file    Number of children: Not on file    Years of education: Not on file    Highest education level: Not on file   Occupational History    Not on file   Tobacco Use    Smoking status: Never Smoker    Smokeless tobacco: Never Used   Vaping Use    Vaping Use: Never used   Substance and Sexual Activity    Alcohol use: Yes     Comment: occasional beer    Drug use: No    Sexual activity: Not on file   Other Topics Concern    Not on file   Social History Narrative    ** Merged History Encounter **          Social Determinants of Health     Financial Resource Strain:     Difficulty of Paying Living Expenses:    Food Insecurity:     Worried About Running Out of Food in the Last Year:     Ran Out of Food in the Last Year:    Transportation Needs:     Lack of Transportation (Medical):      Lack of Transportation (Non-Medical):    Physical Activity:     Days of Exercise per Week:     Minutes of Exercise per Session:    Stress:     Feeling of Stress :    Social Connections:     Frequency of Communication with Friends and Family:     Frequency of Social Gatherings with Friends and Family:     Attends Mandaen Services:     Active Member of Clubs or Organizations:     Attends Club or Organization Meetings:     Marital Status:    Intimate Partner Violence:     Fear of Current or Ex-Partner:     Emotionally Abused:     Physically Abused:     Sexually Abused:      Family History   Problem Relation Age of Onset    Heart Disease Mother     High Blood Pressure Mother     Arthritis Mother     Heart Attack Father     Breast Cancer Sister     High Blood Pressure Brother     Coronary Art Dis Mother    [facility] Osteoporosis Mother     Coronary Art Dis Father     High Blood Pressure Father     Emphysema Father     Cancer Sister     Stroke Maternal Grandmother      Current Outpatient Medications   Medication Sig Dispense Refill    Insulin Disposable Pump (OMNIPOD DASH 5 PACK PODS) MISC CHANGE EVERY 3 DAYS 10 each 4    blood glucose test strips (ONETOUCH VERIO) strip Test 4 times daily. 200 each 5    metFORMIN (GLUCOPHAGE-XR) 500 MG extended release tablet Take 1 tablet by mouth daily 30 tablet 3    insulin lispro (HUMALOG) 100 UNIT/ML injection vial upto 100 units per insulin pump 3 vial 3    diclofenac 1 % CREA Place onto the skin as needed for Pain      IRON PO Take 65 mg by mouth      candesartan (ATACAND) 4 MG tablet Take 4 mg by mouth daily      Blood Glucose Monitoring Suppl (ONETOUCH VERIO FLEX SYSTEM) w/Device KIT by Does not apply route      aspirin 81 MG chewable tablet Take 81 mg by mouth      vitamin D (CHOLECALCIFEROL) 1000 units TABS tablet Take 1,000 Units by mouth      cyanocobalamin (CVS VITAMIN B12) 1000 MCG tablet Take 1,000 mcg by mouth      pramipexole (MIRAPEX) 0.25 MG tablet Take 0.5 mg by mouth Pt takes 0.5 mg daily      simvastatin (ZOCOR) 10 MG tablet        No current facility-administered medications for this visit. Allergies   Allergen Reactions    Allevess [Capsaicin-Menthol]      Pt denies 10/18/18    Lisinopril Other (See Comments)     Low BP     Morphine      Pt denies 18    Naproxen Sodium Hives    Nitroglycerin      Low blood pressure, pt denies 18     Family Status   Relation Name Status    Mother  Alive    Father      Sister  Alive    Brother  Alive    Sister  Alive    MGM  (Not Specified)         OBJECTIVE:  There were no vitals taken for this visit. Wt Readings from Last 3 Encounters:   20 143 lb (64.9 kg)   20 137 lb (62.1 kg)   19 138 lb (62.6 kg)       Patient is  alert oriented to time ,place and person.  Both osteoporosis she does have family history of osteoporosis with her mom having osteoporosis. FIBROMYALGIA   Stable follows with Dr Isa Vidal   She had dexa scan done as per pt within last 1-2 years and is normal       Reviewed and/or ordered clinical lab results Yes  Reviewed and/or ordered radiology tests Yes  Reviewed and/or ordered other diagnostic tests Yes  Made a decision to obtain old records Yes  Reviewed and summarized old records Yes    Bebeto Jones was counseled regarding symptoms of current diagnosis, course and complications of disease if inadequately treated, side effects of medications, diagnosis, treatment options, and prognosis, risks, benefits, complications, and alternatives of treatment, labs, imaging and other studies and treatment targets and goals. She understands instructions and counseling. These diagnosis were discussed and reviewed with the patient including the advantages of drug therapy. She was counseled at this visit on the following: diabetes complication prevention and foot care. This was a phone conversation in [place] of in-person visit due to coronavirus emergency. Patient provided verbal consent for an \"over the phone visit'. Location of patient; home  Total time spent 25+  minutes on this call conducting an interview, collecting data and reviewing her chart, performing a limited exam by phone and educating the patient on my assessment and plan. Return in about 3 months (around 9/24/2021).

## 2021-06-28 ENCOUNTER — TELEPHONE (OUTPATIENT)
Dept: ENDOCRINOLOGY | Age: 66
End: 2021-06-28

## 2021-06-28 DIAGNOSIS — E10.65 UNCONTROLLED TYPE 1 DIABETES MELLITUS WITH HYPERGLYCEMIA (HCC): Primary | ICD-10-CM

## 2021-06-28 NOTE — TELEPHONE ENCOUNTER
Pt just had recent appt and I do not see new lab orders placed for her next appt in 3 months.  Pt will need them mailed to her once placed

## 2021-07-07 ENCOUNTER — TELEPHONE (OUTPATIENT)
Dept: ENDOCRINOLOGY | Age: 66
End: 2021-07-07

## 2021-09-21 ENCOUNTER — TELEPHONE (OUTPATIENT)
Dept: ENDOCRINOLOGY | Age: 66
End: 2021-09-21

## 2021-09-23 NOTE — TELEPHONE ENCOUNTER
Patient aware lab orders are in the system. Patient went to a [facility] and did not realize we are not in their system. She will go get labs on Monday at a [address] [facility] facility.

## 2021-09-30 ENCOUNTER — OFFICE VISIT (OUTPATIENT)
Dept: ENDOCRINOLOGY | Age: 66
End: 2021-09-30
Payer: MEDICARE

## 2021-09-30 VITALS
SYSTOLIC BLOOD PRESSURE: 138 MMHG | HEART RATE: 64 BPM | WEIGHT: 140 LBS | BODY MASS INDEX: 23.3 KG/M2 | DIASTOLIC BLOOD PRESSURE: 71 MMHG

## 2021-09-30 DIAGNOSIS — R91.1 LUNG NODULE: ICD-10-CM

## 2021-09-30 DIAGNOSIS — E10.649 UNCONTROLLED TYPE 1 DIABETES MELLITUS WITH HYPOGLYCEMIA WITHOUT COMA (HCC): Primary | ICD-10-CM

## 2021-09-30 DIAGNOSIS — I10 ESSENTIAL HYPERTENSION: ICD-10-CM

## 2021-09-30 PROCEDURE — 3052F HG A1C>EQUAL 8.0%<EQUAL 9.0%: CPT | Performed by: INTERNAL MEDICINE

## 2021-09-30 PROCEDURE — 99215 OFFICE O/P EST HI 40 MIN: CPT | Performed by: INTERNAL MEDICINE

## 2021-09-30 PROCEDURE — 95251 CONT GLUC MNTR ANALYSIS I&R: CPT | Performed by: INTERNAL MEDICINE

## 2021-09-30 ASSESSMENT — ENCOUNTER SYMPTOMS: VISUAL CHANGE: 0

## 2021-09-30 NOTE — PROGRESS NOTES
Aiden Wooten is a 74 y.o. female  seen for follow up of  Uncontrolled  Type 1 Diabetes . Pt was diagnosed with T1Dm in year 1996 she was initaily started on metformin but after a couple of months she was switched to insulin and was on insulin pump for years and then switched to basal bolus regimen as she didn't want a pump with tubing   She got diabetic education in the past .She feels she has been coumnting her carbs accurately  . She has hypoglycemia awareness and her threshold is in the 60's   She has Fibromyalgia and follows with dr Oscar Truong although she feels like she does not have fibromyalgia  She has AAA and follows with Dr Tom Gómez:    Diabetes  She presents for her follow-up diabetic visit. She has type 1 diabetes mellitus. No MedicAlert identification noted. The initial diagnosis of diabetes was made 23 years ago. Her disease course has been stable. Hypoglycemia symptoms include nervousness/anxiousness, speech difficulty, sweats and tremors. Pertinent negatives for diabetes include no foot ulcerations, no polydipsia, no polyphagia, no polyuria, no visual change, no weakness and no weight loss. There are no hypoglycemic complications. Symptoms are stable. Diabetic complications include peripheral neuropathy. Risk factors for coronary artery disease include diabetes mellitus, dyslipidemia and post-menopausal. Current diabetic treatment includes intensive insulin program. She is compliant with treatment most of the time. Her weight is stable. She is following a generally healthy and diabetic diet. Meal planning includes avoidance of concentrated sweets. She has had a previous visit with a dietitian. She participates in exercise weekly. There is no change in her home blood glucose trend. Her breakfast blood glucose is taken between 8-9 am. Her breakfast blood glucose range is generally 130-140 mg/dl.  Her lunch blood glucose is taken between 12-1 pm. Her lunch blood glucose range is generally 140-180 mg/dl. An ACE inhibitor/angiotensin II receptor blocker is being taken. She does not see a podiatrist.Eye exam is current. She had some issues with her Dexcom sensor,  Denies any unexplained hypoglycemia, denies any excessive alcohol use    Weight trend: stable  Prior visit with dietician: yes  Current diet: on average, 3 meals per day  Current exercise: 3 times a week    Has there been any hospitalization, surgery or major illness since the last visit? No   Has there been any new school, family or social problems since the last visit? No  Has there been any new family history of members with diabetes, heart disease, stroke, or endocrine related problems since the last visit?   No    Past Medical History:   Diagnosis Date    Aortic dissection (HCC)     Arthritis     Chicken pox     Family history of cancer     Family history of coronary artery disease     Fibromyalgia     H/O cardiovascular stress test     HSV-1 infection     Hyperlipidemia     Hypertension     Infertility, female     Iron deficiency     Osteopenia     Postmenopausal bleeding     Rheumatoid arteritis (HCC)     Type 1 diabetes (HCC)     Type 1 diabetes mellitus (HCC)     Vitamin B 12 deficiency     Vitamin D deficiency       Patient Active Problem List   Diagnosis    Fibromyalgia    Arthritis    Type 1 diabetes mellitus without complication (HCC)    Vitamin D deficiency    Hyperlipidemia    Vitamin B 12 deficiency    Aortic dissection, abdominal (HCC)    Chronic midline low back pain without sciatica    Cramp of limb    Hypertension    Lung nodule    Myofascial pain    Myopathy    Osteoarthrosis    Osteopenia    Pain in limb    Restless leg syndrome    Right foot injury    Toe fracture, right    Uncontrolled type 1 diabetes mellitus ([facility] [address]     Past Surgical History:   Procedure Laterality Date    APPENDECTOMY      CATARACT REMOVAL Bilateral 08/2018   [facility] SURGERY  2018 Emphysema Father     Cancer Sister     Stroke Maternal Grandmother      Current Outpatient Medications   Medication Sig Dispense Refill    Insulin Disposable Pump (OMNIPOD DASH 5 PACK PODS) MISC CHANGE EVERY 3 DAYS 10 each 5    insulin lispro (HUMALOG) 100 UNIT/ML injection vial INJECT UP  UNITS DAILY VIA INSULIN PUMP 3 vial 2    blood glucose test strips (ONETOUCH VERIO) strip Test 4 times daily. 200 each 5    metFORMIN (GLUCOPHAGE-XR) 500 MG extended release tablet Take 1 tablet by mouth daily 30 tablet 3    diclofenac 1 % CREA Place onto the skin as needed for Pain      IRON PO Take 65 mg by mouth      candesartan (ATACAND) 4 MG tablet Take 4 mg by mouth daily      Blood Glucose Monitoring Suppl (ONETOUCH VERIO FLEX SYSTEM) w/Device KIT by Does not apply route      aspirin 81 MG chewable tablet Take 81 mg by mouth      vitamin D (CHOLECALCIFEROL) 1000 units TABS tablet Take 1,000 Units by mouth      cyanocobalamin ([employer] VITAMIN B12) 1000 MCG tablet Take 1,000 mcg by mouth      pramipexole (MIRAPEX) 0.25 MG tablet Take 0.5 mg by mouth Pt takes 0.5 mg daily      simvastatin (ZOCOR) 10 MG tablet        No current facility-administered medications for this visit. Allergies   Allergen Reactions    Allevess [Capsaicin-Menthol]      Pt denies 10/18/18    Lisinopril Other (See Comments)     Low BP     Morphine      Pt denies 18    Naproxen Sodium Hives    Nitroglycerin      Low blood pressure, pt denies 18     Family Status   Relation Name Status    Mother  Alive    Father      Sister  Alive    Brother  Alive    Sister  Alive    MGM  (Not Specified)         ROS    I have reviewed the review of system questionnaire filled by the patient .   Patient was advised to contact PCP for non endocrine signs and symptoms       OBJECTIVE:  /71   Pulse 64   Wt 140 lb (63.5 kg)   BMI 23.30 kg/m²    Wt Readings from Last 3 Encounters:   21 140 lb (63.5 kg)   20 143 lb (64.9 kg)   01/22/20 137 lb (62.1 kg)     Constitutional: no acute distress, well appearing, well nourished  Psychiatric: oriented to person, place and time, judgement, insight and normal, recent and remote memory and intact and mood, affect are normal  Skin: skin and subcutaneous tissue is normal without mass,   Head and Face: examination of head and face revealed no abnormalities  Eyes: no lid or conjunctival swelling, no erythema or discharge, pupils are normal,   Ears/Nose: external inspection of ears and nose revealed no abnormalities, hearing is grossly normal  Oropharynx/Mouth/Face: lips, tongue and gums are normal with no lesions, the voice quality was normal  Neck: neck is supple and symmetric, with midline trachea and no masses, thyroid is normal    Pulmonary: no increased work of breathing or signs of respiratory distress, lungs are clear to auscultation  Cardiovascular: normal heart rate and rhythm, normal S1 and S2,   Musculoskeletal: normal gait and station,   Neurological: normal coordination, normal general cortical function          Lab Results   Component Value Date    LABA1C 8.0 06/15/2021    LABA1C 8.5 02/03/2021    LABA1C 7.9 10/22/2020         ASSESSMENT/PLAN:      Uncontrolled TYPE 1 DIABETES WITH COMPLICATIONS -- 2.3>>3.3 > 7.9>.8.5>>7.9>>7.5>>8.4>>8.5 >>8.5>>8.0>>7.5>>7.1>>8.3>>7.9>>8.4>>8.8>>8.4>>8.0>>8.5>>8.0>>7.6    A1c improved slightly  She has correctoin related hypoglycemia   I have reviewed her Dexcom and OmniPod data with her in detail  Most excursions are meal related  Have made the following changes  Changed CIR 13:1 >>12 :1 with breakfast and lunch  CIR dinner change 9:1  Also switched her correction factor from 55-60  She was again advised to take her meal boluses 10 minutes before she eats she still struggles with that   She was advised to avoid snacks at bedtime especially if the blood sugars are already high     Met with diabetes educator oct 2018      ----she will continue with metformin 500 mg bid   --victoza gave extreme nausea so not taking it      --Patient had symptomatic hypoglycemia due to overcorrection so we gave her regular soda before she left the office the glucose was 130  ---Hypoglycemia protocol reviewed in detail with patient Patient was advised to carry glucose tablets and also have glucagon emergency kit. Provided with RX for glucagon. Health maintenance  --eye exam no retinopathy no retinopathy oct 2019   --feet exam examianed ---discussed feet care    last microalbumin to creatinine ratio was normal in September 2021 with a level of 4.8     --- Thyroid normal on palpation No discreet thyroid nodules identified on exam     Abdominal aortic aneurysm   Stable imaging in 2014   Last imaging in 2017   She is seeing vascular Dr Cl Goetz     -- stress test and visit with cards with no CAD     Low WBS   Follows with hematology     Hyperlipidemia   She is now 10 mg of simva ldl was 40   She has strong family hx of CAD despite good cholesterol. HYPERTENSION    well controlled on the current regimen   Continue with current regimen   Mitral valve prolapse     POSTMENOPAUSAL   Ob /gyn has been ordering dexa    she doesn't want to take medication for osteoporosis she does have family history of osteoporosis with her mom having osteoporosis.       FIBROMYALGIA   Stable follows with Dr Shay Jj   She had dexa scan done as per pt within last 1-2 years and is normal       Reviewed and/or ordered clinical lab results Yes  Reviewed and/or ordered radiology tests Yes  Reviewed and/or ordered other diagnostic tests Yes  Made a decision to obtain old records Yes  Reviewed and summarized old records Yes    Beata Rosales was counseled regarding symptoms of current diagnosis, course and complications of disease if inadequately treated, side effects of medications, diagnosis, treatment options, and prognosis, risks, benefits, complications, and alternatives of treatment, labs, imaging and other studies and treatment targets and goals. She understands instructions and counseling. These diagnosis were discussed and reviewed with the patient including the advantages of drug therapy. She was counseled at this visit on the following: diabetes complication prevention and foot care. Return in about 3 months (around 12/30/2021). Diabetes Continuous Glucose Monitoring Report         Reason for Study:     - improve diabetic control without risk of hypoglycemia       Current Medication regimen:   omnipod      CGMS Report     CGMS data collection was performed on sept 30, 2021   Patient provided information on her  diet, activities and insulin dosing  during this period. Data was available for  14   days     Sensor Data Report:   - 12 AM to 6 AM: Overnight blood glucose pattern shows  - 6   AM to 10 AM:  Post breakfast  is noted  --10AM to 5 PM : Occasional hypoglycemia observed during this time.   - 5   PM to 8 PM: Post meal  is noted       Average reading 169   mg/dL     Standard Dev  74   mg/dL   % of time <70 mg/dL  7 %   % of time >180  Mg/dL40  %   % of time within range 53  %  Number of hypoglycemia episodes noted: 0     Impression:   CGMS shows correction related hypoglycemia postprandial hyperglycemia no     Recommendation:      Patient was advised to make the following changes in her diabetic regimen  Change sensitivity to 60  Change carbohydrate insulin ratio from 13-1 to 12-1  For dinner change CIR to 9;1

## 2021-11-01 ENCOUNTER — TELEPHONE (OUTPATIENT)
Dept: ENDOCRINOLOGY | Age: 66
End: 2021-11-01

## 2021-11-01 NOTE — TELEPHONE ENCOUNTER
PT calling and stating that she needs to have a letter for her to be able to fly stating that her Dexcom can not be removed.   Please call PT when letter is ready

## 2021-11-01 NOTE — LETTER
Eleanor Burgos MD  [address] [place] [address] [address]  [place], [address] [place] [address] Road  O: [phone]  F: [phone]      To Whom It May Concern,    Please allow my patient Esperanza Talbert (: 1955) to be able to fly with all of her diabetes supplies and medications. It is vital that she continue to wear her Dexcom CGM while flying. If you have any questions or concerns, please feel free to contact our office at [phone]. Stay safe and healthy!             Eleanor Burgos MD  [facility] ([facility]) Endocrinology  O: [phone]  F: [phone] [de-identified] : Persistent abdominal discofort\par worse after eating\par omeprazole 40 daily not helpful\par feel distended [FreeTextEntry1] : Abd bloat

## 2021-11-10 ENCOUNTER — TELEPHONE (OUTPATIENT)
Dept: ENDOCRINOLOGY | Age: 66
End: 2021-11-10

## 2021-12-20 DIAGNOSIS — E10.9 TYPE 1 DIABETES MELLITUS WITHOUT COMPLICATION (HCC): ICD-10-CM

## 2021-12-20 RX ORDER — INSULIN PUMP CONTROLLER
EACH MISCELLANEOUS
Qty: 2 EACH | Refills: 0 | Status: SHIPPED | OUTPATIENT
Start: 2021-12-20 | End: 2022-02-24

## 2022-01-25 ENCOUNTER — TELEPHONE (OUTPATIENT)
Dept: ENDOCRINOLOGY | Age: 67
End: 2022-01-25

## 2022-01-25 NOTE — TELEPHONE ENCOUNTER
After reviewing the pt's chart the pt was prescribed the below listed medication     Metformin 500mg     To be taken as directed     TAKE ONE TABLET BY MOUTH TWICE A DAY    A script was sent on 12/17/2022 and was given 3 additional refills     The pharmacy was called to verify the script that was sent on 12/17/2022    The pharmacy confirmed that they were trying to refill an old script     The pt was called to inform her that a script reflecting that the pt is to take the medication as directed above     The pt stated that she called the office the first of the year and was told to take 1 tab once a day but she will start taking the medication as directed, I informed the pt that I will inform the MD and that her appt in on 2/9/2022 if she wants to touch base on the metformin ( being adjusted) then, the pt said that will be fine, and if she had any concerns to call the office

## 2022-01-25 NOTE — TELEPHONE ENCOUNTER
Fax from [facility] states her MD informed she should take Metformin ER 500mg -take 1 tab daily    Current rx is for twice daily?

## 2022-02-07 DIAGNOSIS — I71.02 AORTIC DISSECTION, ABDOMINAL (HCC): ICD-10-CM

## 2022-02-07 DIAGNOSIS — E10.649 UNCONTROLLED TYPE 1 DIABETES MELLITUS WITH HYPOGLYCEMIA WITHOUT COMA (HCC): ICD-10-CM

## 2022-02-07 LAB
A/G RATIO: 2.1 (ref 1.1–2.2)
ALBUMIN SERPL-MCNC: 4.2 G/DL (ref 3.4–5)
ALP BLD-CCNC: 72 U/L (ref 40–129)
ALT SERPL-CCNC: 12 U/L (ref 10–40)
ANION GAP SERPL CALCULATED.3IONS-SCNC: 12 MMOL/L (ref 3–16)
AST SERPL-CCNC: 15 U/L (ref 15–37)
BILIRUB SERPL-MCNC: 0.5 MG/DL (ref 0–1)
BUN BLDV-MCNC: 21 MG/DL (ref 7–20)
CALCIUM SERPL-MCNC: 9.6 MG/DL (ref 8.3–10.6)
CHLORIDE BLD-SCNC: 104 MMOL/L (ref 99–110)
CHOLESTEROL, TOTAL: 151 MG/DL (ref 0–199)
CO2: 26 MMOL/L (ref 21–32)
CREAT SERPL-MCNC: 0.6 MG/DL (ref 0.6–1.2)
CREATININE URINE: 119.2 MG/DL (ref 28–259)
ESTIMATED AVERAGE GLUCOSE: 182.9 MG/DL
GFR AFRICAN AMERICAN: >60
GFR NON-AFRICAN AMERICAN: >60
GLUCOSE BLD-MCNC: 99 MG/DL (ref 70–99)
HBA1C MFR BLD: 8 %
HDLC SERPL-MCNC: 91 MG/DL (ref 40–60)
LDL CHOLESTEROL CALCULATED: 47 MG/DL
MICROALBUMIN UR-MCNC: <1.2 MG/DL
MICROALBUMIN/CREAT UR-RTO: NORMAL MG/G (ref 0–30)
POTASSIUM SERPL-SCNC: 4.8 MMOL/L (ref 3.5–5.1)
SODIUM BLD-SCNC: 142 MMOL/L (ref 136–145)
TOTAL PROTEIN: 6.2 G/DL (ref 6.4–8.2)
TRIGL SERPL-MCNC: 65 MG/DL (ref 0–150)
TSH SERPL DL<=0.05 MIU/L-ACNC: 2.4 UIU/ML (ref 0.27–4.2)
VLDLC SERPL CALC-MCNC: 13 MG/DL

## 2022-02-09 ENCOUNTER — OFFICE VISIT (OUTPATIENT)
Dept: ENDOCRINOLOGY | Age: 67
End: 2022-02-09
Payer: MEDICARE

## 2022-02-09 VITALS
DIASTOLIC BLOOD PRESSURE: 61 MMHG | SYSTOLIC BLOOD PRESSURE: 127 MMHG | HEIGHT: 65 IN | RESPIRATION RATE: 16 BRPM | HEART RATE: 77 BPM | BODY MASS INDEX: 23.66 KG/M2 | WEIGHT: 142 LBS

## 2022-02-09 DIAGNOSIS — E10.65 UNCONTROLLED TYPE 1 DIABETES MELLITUS WITH HYPERGLYCEMIA (HCC): Primary | ICD-10-CM

## 2022-02-09 PROCEDURE — 3052F HG A1C>EQUAL 8.0%<EQUAL 9.0%: CPT | Performed by: INTERNAL MEDICINE

## 2022-02-09 PROCEDURE — 95251 CONT GLUC MNTR ANALYSIS I&R: CPT | Performed by: INTERNAL MEDICINE

## 2022-02-09 PROCEDURE — 99215 OFFICE O/P EST HI 40 MIN: CPT | Performed by: INTERNAL MEDICINE

## 2022-02-09 ASSESSMENT — ENCOUNTER SYMPTOMS: VISUAL CHANGE: 0

## 2022-02-09 NOTE — PROGRESS NOTES
Elmer Vogt is a 74 y.o. female  seen for follow up of  Uncontrolled  Type 1 Diabetes . Pt was diagnosed with T1Dm in year 1996 she was initaily started on metformin but after a couple of months she was switched to insulin and was on insulin pump for years and then switched to basal bolus regimen as she didn't want a pump with tubing   She got diabetic education in the past .She feels she has been coumnting her carbs accurately  . She has hypoglycemia awareness and her threshold is in the 60's   She has Fibromyalgia and follows with dr Sayda Argueta although she feels like she does not have fibromyalgia  She has AAA and follows with Dr Cindy Thomason:    Diabetes  She presents for her follow-up diabetic visit. She has type 1 diabetes mellitus. No MedicAlert identification noted. The initial diagnosis of diabetes was made 23 years ago. Her disease course has been stable. Hypoglycemia symptoms include nervousness/anxiousness, speech difficulty, sweats and tremors. Pertinent negatives for diabetes include no foot ulcerations, no polydipsia, no polyphagia, no polyuria, no visual change, no weakness and no weight loss. There are no hypoglycemic complications. Symptoms are stable. Diabetic complications include peripheral neuropathy. Risk factors for coronary artery disease include diabetes mellitus, dyslipidemia and post-menopausal. Current diabetic treatment includes intensive insulin program. She is compliant with treatment most of the time. Her weight is stable. She is following a generally healthy and diabetic diet. Meal planning includes avoidance of concentrated sweets. She has had a previous visit with a dietitian. She participates in exercise weekly. There is no change in her home blood glucose trend. Her breakfast blood glucose is taken between 8-9 am. Her breakfast blood glucose range is generally 130-140 mg/dl.  Her lunch blood glucose is taken between 12-1 pm. Her lunch blood glucose range is generally 140-180 mg/dl. An ACE inhibitor/angiotensin II receptor blocker is being taken. She does not see a podiatrist.Eye exam is current. Continues to have meal related glycemic excursions,  Denies any unexplained hypoglycemia, denies any excessive alcohol use    Weight trend: stable  Prior visit with dietician: yes  Current diet: on average, 3 meals per day  Current exercise: 3 times a week    Has there been any hospitalization, surgery or major illness since the last visit? No   Has there been any new school, family or social problems since the last visit? No  Has there been any new family history of members with diabetes, heart disease, stroke, or endocrine related problems since the last visit?   No    Past Medical History:   Diagnosis Date    Aortic dissection (HCC)     Arthritis     Chicken pox     Family history of cancer     Family history of coronary artery disease     Fibromyalgia     H/O cardiovascular stress test     HSV-1 infection     Hyperlipidemia     Hypertension     Infertility, female     Iron deficiency     Osteopenia     Postmenopausal bleeding     Rheumatoid arteritis (HCC)     Type 1 diabetes (HCC)     Type 1 diabetes mellitus ([facility] [address]     Vitamin B 12 deficiency     Vitamin D deficiency       Patient Active Problem List   Diagnosis    Fibromyalgia    Arthritis    Type 1 diabetes mellitus without complication (HCC)    Vitamin D deficiency    Hyperlipidemia    Vitamin B 12 deficiency    Aortic dissection, abdominal (HCC)    Chronic midline low back pain without sciatica    Cramp of limb    Hypertension    Lung nodule    Myofascial pain    Myopathy    Osteoarthrosis    Osteopenia    Pain in limb    Restless leg syndrome    Right foot injury    Toe fracture, right    Uncontrolled type 1 diabetes mellitus (HCC)     Past Surgical History:   Procedure Laterality Date    APPENDECTOMY      CATARACT REMOVAL Bilateral 08/2018    DENTAL SURGERY      DENTAL SURGERY 2018    implant     TONSILLECTOMY      TONSILLECTOMY AND ADENOIDECTOMY      TOOTH EXTRACTION      2018    TUBAL LIGATION       Social History     Socioeconomic History    Marital status:      Spouse name: Not on file    Number of children: Not on file    Years of education: Not on file    Highest education level: Not on file   Occupational History    Not on file   Tobacco Use    Smoking status: Never Smoker    Smokeless tobacco: Never Used   Vaping Use    Vaping Use: Never used   Substance and Sexual Activity    Alcohol use: Yes     Comment: occasional beer    Drug use: No    Sexual activity: Not on file   Other Topics Concern    Not on file   Social History Narrative    ** Merged History Encounter **          Social Determinants of Health     Financial Resource Strain:     Difficulty of Paying Living Expenses: Not on file   Food Insecurity:     Worried About [address] in the Last Year: Not on file    Gary of Food in the Last Year: Not on file   Transportation Needs:     Lack of Transportation (Medical): Not on file    Lack of Transportation (Non-Medical):  Not on file   Physical Activity:     Days of Exercise per Week: Not on file    Minutes of Exercise per Session: Not on file   Stress:     Feeling of Stress : Not on file   Social Connections:     Frequency of Communication with Friends and Family: Not on file    Frequency of Social Gatherings with Friends and Family: Not on file    Attends Religion Services: Not on file    Active Member of Clubs or Organizations: Not on file    Attends Club or Organization Meetings: Not on file    Marital Status: Not on file   Intimate Partner Violence:     Fear of Current or Ex-Partner: Not on file    Emotionally Abused: Not on file    Physically Abused: Not on file    Sexually Abused: Not on file   Housing Stability:     Unable to Pay for Housing in the Last Year: Not on file    Number of [place] in the Last Year: Not on file    Unstable Housing in the Last Year: Not on file     Family History   Problem Relation Age of Onset    Heart Disease Mother     High Blood Pressure Mother     Arthritis Mother     Heart Attack Father     Breast Cancer Sister     High Blood Pressure Brother     Coronary Art Dis Mother     Osteoporosis Mother     Coronary Art Dis Father     High Blood Pressure Father     Emphysema Father     Cancer Sister     Stroke Maternal Grandmother      Current Outpatient Medications   Medication Sig Dispense Refill    insulin lispro (HUMALOG) 100 UNIT/ML injection vial INJECT UP  UNITS VIA INSULIN PUMP DAILY 30 mL 1    Insulin Disposable Pump (OMNIPOD DASH 5 PACK PODS) MISC CHANGE EVERY 3 DAYS 2 each 0    metFORMIN (GLUCOPHAGE-XR) 500 MG extended release tablet TAKE ONE TABLET BY MOUTH TWICE A DAY 60 tablet 3    blood glucose test strips (ONETOUCH VERIO) strip Test 4 times daily. 200 each 5    diclofenac 1 % CREA Place onto the skin as needed for Pain      IRON PO Take 65 mg by mouth      candesartan (ATACAND) 4 MG tablet Take 4 mg by mouth daily      Blood Glucose Monitoring Suppl (ONETOUCH VERIO FLEX SYSTEM) w/Device KIT by Does not apply route      aspirin 81 MG chewable tablet Take 81 mg by mouth      vitamin D (CHOLECALCIFEROL) 1000 units TABS tablet Take 1,000 Units by mouth      cyanocobalamin ([employer] VITAMIN B12) 1000 MCG tablet Take 1,000 mcg by mouth      pramipexole (MIRAPEX) 0.25 MG tablet Take 0.5 mg by mouth Pt takes 0.5 mg daily      simvastatin (ZOCOR) 10 MG tablet        No current facility-administered medications for this visit.      Allergies   Allergen Reactions    Allevess [Capsaicin-Menthol]      Pt denies 10/18/18    Lisinopril Other (See Comments)     Low BP     Morphine      Pt denies 18    Naproxen Sodium Hives    Nitroglycerin      Low blood pressure, pt denies 18     Family Status   Relation Name Status    Mother  Alive    Father      Sister  Alive    Brother  Alive    Sister  Alive    MGM  (Not Specified)         ROS    I have reviewed the review of system questionnaire filled by the patient .   Patient was advised to contact PCP for non endocrine signs and symptoms       OBJECTIVE:  /61   Pulse 77   Resp 16   Ht 5' 5\" (1.651 m)   Wt 142 lb (64.4 kg)   BMI 23.63 kg/m²    Wt Readings from Last 3 Encounters:   02/09/22 142 lb (64.4 kg)   09/30/21 140 lb (63.5 kg)   11/03/20 143 lb (64.9 kg)     Constitutional: no acute distress, well appearing, well nourished  Psychiatric: oriented to person, place and time, judgement, insight and normal, recent and remote memory and intact and mood, affect are normal  Skin: skin and subcutaneous tissue is normal without mass,   Head and Face: examination of head and face revealed no abnormalities  Eyes: no lid or conjunctival swelling, no erythema or discharge, pupils are normal,   Ears/Nose: external inspection of ears and nose revealed no abnormalities, hearing is grossly normal  Oropharynx/Mouth/Face: lips, tongue and gums are normal with no lesions, the voice quality was normal  Neck: neck is supple and symmetric, with midline trachea and no masses, thyroid is normal    Pulmonary: no increased work of breathing or signs of respiratory distress, lungs are clear to auscultation  Cardiovascular: normal heart rate and rhythm, normal S1 and S2,   Musculoskeletal: normal gait and station,   Neurological: normal coordination, normal general cortical function          Lab Results   Component Value Date    LABA1C 8.0 02/07/2022    LABA1C 8.0 06/15/2021    LABA1C 8.5 02/03/2021         ASSESSMENT/PLAN:      Uncontrolled TYPE 1 DIABETES WITH COMPLICATIONS -- 6.7>>2.9 > 7.9>.8.5>>7.9>>7.5>>8.4>>8.5 >>8.5>>8.0>>7.5>>7.1>>8.3>>7.9>>8.4>>8.8>>8.4>>8.0>>8.5>>8.0>>7.6    A1c stable not at target   She has meal related hyperglycemia  I have reviewed her Dexcom and OmniPod data with her in detail  Most excursions lab results Yes  Reviewed and/or ordered radiology tests Yes  Reviewed and/or ordered other diagnostic tests Yes  Made a decision to obtain old records Yes  Reviewed and summarized old records Yes    Alma Delia Hamilton was counseled regarding symptoms of current diagnosis, course and complications of disease if inadequately treated, side effects of medications, diagnosis, treatment options, and prognosis, risks, benefits, complications, and alternatives of treatment, labs, imaging and other studies and treatment targets and goals. She understands instructions and counseling. These diagnosis were discussed and reviewed with the patient including the advantages of drug therapy. She was counseled at this visit on the following: diabetes complication prevention and foot care. I spent  40 + minutes which includes reviewing patient chart , interpreting previous lab results  , discussing and providing counseling and coordinating care of patient's multiple health issues with  the patient. Return in about 3 months (around 5/9/2022). Diabetes Continuous Glucose Monitoring Report         Reason for Study:     - improve diabetic control without risk of hypoglycemia       Current Medication regimen:   omnipod      CGMS Report     CGMS data collection was performed on feb 9 , 2022  Patient provided information on her  diet, activities and insulin dosing  during this period. Data was available for 14   days     Sensor Data Report:   - 12 AM to 6 AM: Overnight blood glucose pattern shows elevated   - 6   AM to 10 AM:  Post breakfast hyperglycemia  is noted  --10AM to 5 PM : Occasional hypoglycemia observed during this time.   - 5   PM to 8 PM: Post meal hyperglycemia  is noted       Average reading 197  mg/dL     Standard Dev  70  mg/dL   % of time <70 mg/dL 2  %   % of time >180  Mg/dL 56  %   % of time within range 42  %  Number of hypoglycemia episodes noted: 0     Impression:   CGMS shows correction related hypoglycemia postprandial hyperglycemia no     Recommendation:      Patient was advised to make the following changes in her diabetic regimen  Patient will try split dose bolusing with meals. I also recommended closed-loop system which she is not interested in as she does not want a pump with tubing.

## 2022-02-24 DIAGNOSIS — E10.9 TYPE 1 DIABETES MELLITUS WITHOUT COMPLICATION (HCC): ICD-10-CM

## 2022-02-24 RX ORDER — INSULIN PUMP CONTROLLER
EACH MISCELLANEOUS
Qty: 10 EACH | Refills: 5 | Status: SHIPPED | OUTPATIENT
Start: 2022-02-24 | End: 2022-08-15

## 2022-02-28 ENCOUNTER — PATIENT MESSAGE (OUTPATIENT)
Dept: ENDOCRINOLOGY | Age: 67
End: 2022-02-28

## 2022-02-28 ENCOUNTER — TELEPHONE (OUTPATIENT)
Dept: ENDOCRINOLOGY | Age: 67
End: 2022-02-28

## 2022-02-28 DIAGNOSIS — E10.65 UNCONTROLLED TYPE 1 DIABETES MELLITUS WITH HYPERGLYCEMIA (HCC): Primary | ICD-10-CM

## 2022-02-28 RX ORDER — BLOOD-GLUCOSE SENSOR
EACH MISCELLANEOUS
Qty: 3 EACH | Refills: 5 | Status: SHIPPED | OUTPATIENT
Start: 2022-02-28 | End: 2022-02-28 | Stop reason: SDUPTHER

## 2022-02-28 RX ORDER — BLOOD-GLUCOSE TRANSMITTER
EACH MISCELLANEOUS
Qty: 1 EACH | Refills: 3 | Status: SHIPPED | OUTPATIENT
Start: 2022-02-28 | End: 2022-02-28 | Stop reason: SDUPTHER

## 2022-02-28 RX ORDER — BLOOD-GLUCOSE SENSOR
EACH MISCELLANEOUS
Qty: 3 EACH | Refills: 5 | Status: SHIPPED | OUTPATIENT
Start: 2022-02-28 | End: 2022-10-11 | Stop reason: SDUPTHER

## 2022-02-28 RX ORDER — BLOOD-GLUCOSE TRANSMITTER
EACH MISCELLANEOUS
Qty: 1 EACH | Refills: 3 | Status: SHIPPED | OUTPATIENT
Start: 2022-02-28

## 2022-02-28 NOTE — TELEPHONE ENCOUNTER
From: [facility]  To: Dr. Dixie Bloom  Sent: 2/28/2022 11:29 AM EST  Subject: New prescription     I received a letter from Yue Duffy telling me I will no longer receive my sensors and transmitters from [facility] ending April 4, 2022. Please send a prescription to [address] [id number]. Their phone number is [phone]. Any questions, please give me a call. Thank you.

## 2022-03-01 NOTE — TELEPHONE ENCOUNTER
Submitted PA for Dexcom G6 Transmitter Key: [id number] - PA Case ID: [id number] - Rx #: [id number]  Via CMM STATUS: PENDING

## 2022-03-08 ENCOUNTER — TELEPHONE (OUTPATIENT)
Dept: ENDOCRINOLOGY | Age: 67
End: 2022-03-08

## 2022-03-08 NOTE — TELEPHONE ENCOUNTER
Patient filled this medication and will be able to return for their next refill according to their plan limits.

## 2022-05-10 DIAGNOSIS — E10.641 UNCONTROLLED TYPE 1 DIABETES MELLITUS WITH HYPOGLYCEMIA AND COMA (HCC): ICD-10-CM

## 2022-05-10 RX ORDER — METFORMIN HYDROCHLORIDE 500 MG/1
TABLET, EXTENDED RELEASE ORAL
Qty: 60 TABLET | Refills: 3 | Status: SHIPPED | OUTPATIENT
Start: 2022-05-10 | End: 2022-09-13

## 2022-05-31 DIAGNOSIS — E10.649 UNCONTROLLED TYPE 1 DIABETES MELLITUS WITH HYPOGLYCEMIA WITHOUT COMA (HCC): ICD-10-CM

## 2022-05-31 RX ORDER — INSULIN LISPRO 100 [IU]/ML
INJECTION, SOLUTION INTRAVENOUS; SUBCUTANEOUS
Qty: 30 ML | Refills: 0 | Status: SHIPPED | OUTPATIENT
Start: 2022-05-31 | End: 2022-06-27

## 2022-05-31 NOTE — TELEPHONE ENCOUNTER
Medication:   Requested Prescriptions     Pending Prescriptions Disp Refills    insulin lispro (HUMALOG) 100 UNIT/ML SOLN injection vial [Pharmacy Med Name: INSULIN LISPRO 100 UNIT/ML VL] 30 mL      Sig: INJECT UP  UNITS VIA INSULIN PUMP DAILY       Last Filled:      Patient Phone Number: [phone] (home)     Last appt: 2/9/2022   Next appt: 6/20/22    Last Labs DM:   Lab Results   Component Value Date    LABA1C 8.0 02/07/2022

## 2022-06-12 DIAGNOSIS — E10.649 UNCONTROLLED TYPE 1 DIABETES MELLITUS WITH HYPOGLYCEMIA WITHOUT COMA (HCC): ICD-10-CM

## 2022-06-13 ENCOUNTER — TELEPHONE (OUTPATIENT)
Dept: ENDOCRINOLOGY | Age: 67
End: 2022-06-13

## 2022-06-13 DIAGNOSIS — E10.65 UNCONTROLLED TYPE 1 DIABETES MELLITUS WITH HYPERGLYCEMIA (HCC): ICD-10-CM

## 2022-06-13 LAB
A/G RATIO: 2 (ref 1.1–2.2)
ALBUMIN SERPL-MCNC: 4.2 G/DL (ref 3.4–5)
ALP BLD-CCNC: 70 U/L (ref 40–129)
ALT SERPL-CCNC: 15 U/L (ref 10–40)
ANION GAP SERPL CALCULATED.3IONS-SCNC: 13 MMOL/L (ref 3–16)
AST SERPL-CCNC: 19 U/L (ref 15–37)
BILIRUB SERPL-MCNC: 0.6 MG/DL (ref 0–1)
BUN BLDV-MCNC: 16 MG/DL (ref 7–20)
CALCIUM SERPL-MCNC: 9.9 MG/DL (ref 8.3–10.6)
CHLORIDE BLD-SCNC: 104 MMOL/L (ref 99–110)
CHOLESTEROL, TOTAL: 140 MG/DL (ref 0–199)
CO2: 23 MMOL/L (ref 21–32)
CREAT SERPL-MCNC: 0.7 MG/DL (ref 0.6–1.2)
CREATININE URINE: 76.8 MG/DL (ref 28–259)
ESTIMATED AVERAGE GLUCOSE: 171.4 MG/DL
GFR AFRICAN AMERICAN: >60
GFR NON-AFRICAN AMERICAN: >60
GLUCOSE BLD-MCNC: 127 MG/DL (ref 70–99)
HBA1C MFR BLD: 7.6 %
HDLC SERPL-MCNC: 89 MG/DL (ref 40–60)
LDL CHOLESTEROL CALCULATED: 40 MG/DL
MICROALBUMIN UR-MCNC: <1.2 MG/DL
MICROALBUMIN/CREAT UR-RTO: NORMAL MG/G (ref 0–30)
POTASSIUM SERPL-SCNC: 4.8 MMOL/L (ref 3.5–5.1)
SODIUM BLD-SCNC: 140 MMOL/L (ref 136–145)
TOTAL PROTEIN: 6.3 G/DL (ref 6.4–8.2)
TRIGL SERPL-MCNC: 57 MG/DL (ref 0–150)
TSH SERPL DL<=0.05 MIU/L-ACNC: 3.12 UIU/ML (ref 0.27–4.2)
VLDLC SERPL CALC-MCNC: 11 MG/DL

## 2022-06-13 RX ORDER — BLOOD SUGAR DIAGNOSTIC
STRIP MISCELLANEOUS
Qty: 100 STRIP | Refills: 5 | Status: SHIPPED | OUTPATIENT
Start: 2022-06-13

## 2022-06-17 DIAGNOSIS — E10.641 UNCONTROLLED TYPE 1 DIABETES MELLITUS WITH HYPOGLYCEMIA AND COMA (HCC): ICD-10-CM

## 2022-06-17 RX ORDER — METFORMIN HYDROCHLORIDE 500 MG/1
TABLET, EXTENDED RELEASE ORAL
Qty: 60 TABLET | Refills: 3 | OUTPATIENT
Start: 2022-06-17

## 2022-06-20 ENCOUNTER — OFFICE VISIT (OUTPATIENT)
Dept: ENDOCRINOLOGY | Age: 67
End: 2022-06-20
Payer: MEDICARE

## 2022-06-20 VITALS
WEIGHT: 139 LBS | SYSTOLIC BLOOD PRESSURE: 125 MMHG | HEART RATE: 75 BPM | BODY MASS INDEX: 23.16 KG/M2 | RESPIRATION RATE: 16 BRPM | DIASTOLIC BLOOD PRESSURE: 64 MMHG | HEIGHT: 65 IN

## 2022-06-20 DIAGNOSIS — E10.65 UNCONTROLLED TYPE 1 DIABETES MELLITUS WITH HYPERGLYCEMIA (HCC): Primary | ICD-10-CM

## 2022-06-20 PROCEDURE — 1123F ACP DISCUSS/DSCN MKR DOCD: CPT | Performed by: INTERNAL MEDICINE

## 2022-06-20 PROCEDURE — 95251 CONT GLUC MNTR ANALYSIS I&R: CPT | Performed by: INTERNAL MEDICINE

## 2022-06-20 PROCEDURE — 3051F HG A1C>EQUAL 7.0%<8.0%: CPT | Performed by: INTERNAL MEDICINE

## 2022-06-20 PROCEDURE — 99214 OFFICE O/P EST MOD 30 MIN: CPT | Performed by: INTERNAL MEDICINE

## 2022-06-20 RX ORDER — CANDESARTAN 8 MG/1
8 TABLET ORAL DAILY
COMMUNITY
Start: 2022-06-20

## 2022-06-20 ASSESSMENT — ENCOUNTER SYMPTOMS: VISUAL CHANGE: 0

## 2022-06-20 NOTE — PROGRESS NOTES
Beata Rosales is a 69 y.o. female  seen for follow up of  Uncontrolled  Type 1 Diabetes . Pt was diagnosed with T1Dm in year 1996 she was initaily started on metformin but after a couple of months she was switched to insulin and was on insulin pump for years and then switched to basal bolus regimen as she didn't want a pump with tubing   She got diabetic education in the past .She feels she has been coumnting her carbs accurately  . She has hypoglycemia awareness and her threshold is in the 60's   She has Fibromyalgia and follows with dr Kaci Reynolds although she feels like she does not have fibromyalgia  She has AAA and follows with Dr Aleksandra Hadley:    Diabetes  She presents for her follow-up diabetic visit. She has type 1 diabetes mellitus. No MedicAlert identification noted. The initial diagnosis of diabetes was made 23 years ago. Her disease course has been stable. Hypoglycemia symptoms include nervousness/anxiousness, speech difficulty, sweats and tremors. Pertinent negatives for diabetes include no foot ulcerations, no polydipsia, no polyphagia, no polyuria, no visual change, no weakness and no weight loss. There are no hypoglycemic complications. Symptoms are stable. Diabetic complications include peripheral neuropathy. Risk factors for coronary artery disease include diabetes mellitus, dyslipidemia and post-menopausal. Current diabetic treatment includes intensive insulin program. She is compliant with treatment most of the time. Her weight is stable. She is following a generally healthy and diabetic diet. Meal planning includes avoidance of concentrated sweets. She has had a previous visit with a dietitian. She participates in exercise weekly. There is no change in her home blood glucose trend. Her breakfast blood glucose is taken between 8-9 am. Her breakfast blood glucose range is generally 130-140 mg/dl.  Her lunch blood glucose is taken between 12-1 pm. Her lunch blood glucose range is generally 140-180 mg/dl. An ACE inhibitor/angiotensin II receptor blocker is being taken. She does not see a podiatrist.Eye exam is current. Continues to have meal related glycemic excursions,  Denies any unexplained hypoglycemia, denies any excessive alcohol use    Weight trend: stable  Prior visit with dietician: yes  Current diet: on average, 3 meals per day  Current exercise: 3 times a week    Has there been any hospitalization, surgery or major illness since the last visit? No   Has there been any new school, family or social problems since the last visit? No  Has there been any new family history of members with diabetes, heart disease, stroke, or endocrine related problems since the last visit?   No    Past Medical History:   Diagnosis Date    Aortic dissection (HCC)     Arthritis     Chicken pox     Family history of cancer     Family history of coronary artery disease     Fibromyalgia     H/O cardiovascular stress test     HSV-1 infection     Hyperlipidemia     Hypertension     Infertility, female     Iron deficiency     Osteopenia     Postmenopausal bleeding     Rheumatoid arteritis (HCC)     Type 1 diabetes (HCC)     Type 1 diabetes mellitus ([facility] [address]     Vitamin B 12 deficiency     Vitamin D deficiency       Patient Active Problem List   Diagnosis    Fibromyalgia    Arthritis    Type 1 diabetes mellitus without complication (HCC)    Vitamin D deficiency    Hyperlipidemia    Vitamin B 12 deficiency    Aortic dissection, abdominal (HCC)    Chronic midline low back pain without sciatica    Cramp of limb    Hypertension    Lung nodule    Myofascial pain    Myopathy    Osteoarthrosis    Osteopenia    Pain in limb    Restless leg syndrome    Right foot injury    Toe fracture, right    Uncontrolled type 1 diabetes mellitus (HCC)     Past Surgical History:   Procedure Laterality Date    APPENDECTOMY      CATARACT REMOVAL Bilateral 08/2018    DENTAL SURGERY      DENTAL SURGERY 2018    implant     TONSILLECTOMY      TONSILLECTOMY AND ADENOIDECTOMY      TOOTH EXTRACTION      2018    TUBAL LIGATION       Social History     Socioeconomic History    Marital status:      Spouse name: Not on file    Number of children: Not on file    Years of education: Not on file    Highest education level: Not on file   Occupational History    Not on file   Tobacco Use    Smoking status: Never Smoker    Smokeless tobacco: Never Used   Vaping Use    Vaping Use: Never used   Substance and Sexual Activity    Alcohol use: Yes     Comment: occasional beer    Drug use: No    Sexual activity: Not on file   Other Topics Concern    Not on file   Social History Narrative    ** Merged History Encounter **          Social Determinants of Health     Financial Resource Strain:     Difficulty of Paying Living Expenses: Not on file   Food Insecurity:     Worried About [address] in the Last Year: Not on file    Gary of Food in the Last Year: Not on file   Transportation Needs:     Lack of Transportation (Medical): Not on file    Lack of Transportation (Non-Medical):  Not on file   Physical Activity:     Days of Exercise per Week: Not on file    Minutes of Exercise per Session: Not on file   Stress:     Feeling of Stress : Not on file   Social Connections:     Frequency of Communication with Friends and Family: Not on file    Frequency of Social Gatherings with Friends and Family: Not on file    Attends Orthodox Services: Not on file    Active Member of Clubs or Organizations: Not on file    Attends Club or Organization Meetings: Not on file    Marital Status: Not on file   Intimate Partner Violence:     Fear of Current or Ex-Partner: Not on file    Emotionally Abused: Not on file    Physically Abused: Not on file    Sexually Abused: Not on file   Housing Stability:     Unable to Pay for Housing in the Last Year: Not on file    Number of [place] in the Last Year: Not on file    Unstable Housing in the Last Year: Not on file     Family History   Problem Relation Age of Onset    Heart Disease Mother     High Blood Pressure Mother     Arthritis Mother     Heart Attack Father     Breast Cancer Sister     High Blood Pressure Brother     Coronary Art Dis Mother     Osteoporosis Mother     Coronary Art Dis Father     High Blood Pressure Father     Emphysema Father     Cancer Sister     Stroke Maternal Grandmother      Current Outpatient Medications   Medication Sig Dispense Refill    candesartan (ATACAND) 8 MG tablet       blood glucose test strips (ONETOUCH VERIO) strip TEST FOUR TIMES A  strip 5    insulin lispro (HUMALOG) 100 UNIT/ML SOLN injection vial INJECT UP  UNITS VIA INSULIN PUMP DAILY 30 mL 0    metFORMIN (GLUCOPHAGE-XR) 500 MG extended release tablet TAKE ONE TABLET BY MOUTH TWICE A DAY 60 tablet 3    Continuous Blood Gluc Transmit (DEXCOM G6 TRANSMITTER) MISC To check glucose change one every 3 months 1 each 3    Continuous Blood Gluc Sensor (DEXCOM G6 SENSOR) MISC Change every 10 days 3 each 5    Insulin Disposable Pump (OMNIPOD DASH 5 PACK PODS) MISC CHANGE EVERY 3 DAYS 10 each 5    diclofenac 1 % CREA Place onto the skin as needed for Pain      IRON PO Take 65 mg by mouth      Blood Glucose Monitoring Suppl (ONETOUCH VERIO FLEX SYSTEM) w/Device KIT by Does not apply route      aspirin 81 MG chewable tablet Take 81 mg by mouth      vitamin D (CHOLECALCIFEROL) 1000 units TABS tablet Take 1,000 Units by mouth      cyanocobalamin ([employer] VITAMIN B12) 1000 MCG tablet Take 1,000 mcg by mouth      pramipexole (MIRAPEX) 0.25 MG tablet Take 0.5 mg by mouth Pt takes 0.5 mg daily      simvastatin (ZOCOR) 10 MG tablet        No current facility-administered medications for this visit.      Allergies   Allergen Reactions    Allevess [Capsaicin-Menthol]      Pt denies 10/18/18    Lisinopril Other (See Comments)     Low BP     Morphine Pt denies 18    Naproxen Sodium Hives    Nitroglycerin      Low blood pressure, pt denies 18     Family Status   Relation Name Status    Mother  Alive    Father      Sister  Alive    Brother  Alive    Sister  Alive    MGM  (Not Specified)         ROS    I have reviewed the review of system questionnaire filled by the patient .   Patient was advised to contact PCP for non endocrine signs and symptoms       OBJECTIVE:  /64   Pulse 75   Resp 16   Ht 5' 5\" (1.651 m)   Wt 139 lb (63 kg)   BMI 23.13 kg/m²    Wt Readings from Last 3 Encounters:   22 139 lb (63 kg)   22 142 lb (64.4 kg)   21 140 lb (63.5 kg)     Constitutional: no acute distress, well appearing, well nourished  Psychiatric: oriented to person, place and time, judgement, insight and normal, recent and remote memory and intact and mood, affect are normal  Skin: skin and subcutaneous tissue is normal without mass,   Head and Face: examination of head and face revealed no abnormalities  Eyes: no lid or conjunctival swelling, no erythema or discharge, pupils are normal,   Ears/Nose: external inspection of ears and nose revealed no abnormalities, hearing is grossly normal  Oropharynx/Mouth/Face: lips, tongue and gums are normal with no lesions, the voice quality was normal  Neck: neck is supple and symmetric, with midline trachea and no masses, thyroid is normal    Pulmonary: no increased work of breathing or signs of respiratory distress, lungs are clear to auscultation  Cardiovascular: normal heart rate and rhythm, normal S1 and S2,   Musculoskeletal: normal gait and station,   Neurological: normal coordination, normal general cortical function      Lab Results   Component Value Date    LABA1C 7.6 2022    LABA1C 8.0 2022    LABA1C 8.0 06/15/2021         ASSESSMENT/PLAN:      Uncontrolled TYPE 1 DIABETES WITH COMPLICATIONS -- 1.7>>8.5 > 7.9>.8.5>>7.9>>7.5>>8.4>>8.5 >>8.5>>8.0>>7.5>>7.1>>8.3>>7.9>>8.4>>8.8>>8.4>>8.0>>8.5>>8.0>>7.6    A1c improved but still not at target  She has meal related hyperglycemia  I have reviewed her Dexcom and OmniPod data with her in detail  Most excursions are meal related, we discussed that she can take half a bolus before eating and have a bolus after finishing her meal to see if that better covers are meal related excursions. Change carb to insulin ratio is as follows  CIR with breakfast and lunch 11:1     She was again advised to take her meal boluses 10 minutes before she eats she still struggles with that   She was advised to avoid snacks at bedtime especially if the blood sugars are already high     Met with diabetes educator oct 2018  And is familiar with carb counting     ----she will continue with metformin 500 mg bid   --victoza gave extreme nausea so stopped     Health maintenance  --eye exam no retinopathy no retinopathy jan 2022  --feet exam examianed ---discussed feet care    last microalbumin to creatinine ratio was normal in September 2021 with a level of 4.8     --- Thyroid normal on palpation No discreet thyroid nodules identified on exam     Abdominal aortic aneurysm   Stable imaging in 2014   Last imaging in 2017   She is seeing vascular Dr Primo Valenzuela     -- stress test and visit with cards with no CAD     Low WBS   Follows with hematology     Hyperlipidemia   She is now 10 mg of simva ldl was 40   She has strong family hx of CAD despite good cholesterol. HYPERTENSION    well controlled on the current regimen   Continue with current regimen   Mitral valve prolapse     POSTMENOPAUSAL   Ob /gyn has been ordering dexa    she doesn't want to take medication for osteoporosis she does have family history of osteoporosis with her mom having osteoporosis.       FIBROMYALGIA   Stable follows with Dr Julia Dela Cruz   She had dexa scan done as per pt within last 1-2 years and is normal       Reviewed and/or ordered clinical lab results Yes  Reviewed and/or ordered radiology tests Yes  Reviewed and/or ordered other diagnostic tests Yes  Made a decision to obtain old records Yes  Reviewed and summarized old records Yes    Adrianna Abraham was counseled regarding symptoms of current diagnosis, course and complications of disease if inadequately treated, side effects of medications, diagnosis, treatment options, and prognosis, risks, benefits, complications, and alternatives of treatment, labs, imaging and other studies and treatment targets and goals. She understands instructions and counseling. These diagnosis were discussed and reviewed with the patient including the advantages of drug therapy. She was counseled at this visit on the following: diabetes complication prevention and foot care. No follow-ups on file. Diabetes Continuous Glucose Monitoring Report         Reason for Study:     - improve diabetic control without risk of hypoglycemia       Current Medication regimen:   omnipod      CGMS Report     CGMS data collection was performed on June 20 , 2022  Patient provided information on her  diet, activities and insulin dosing  during this period. Data was available for 14   days     Sensor Data Report:   - 12 AM to 6 AM: Overnight blood glucose pattern shows elevated   - 6   AM to 10 AM:  Post breakfast hyperglycemia  is noted  --10AM to 5 PM : Occasional hypoglycemia observed during this time.   - 5   PM to 8 PM: Post meal hyperglycemia  is noted       Average reading 163  mg/dL     Standard Dev 64  mg/dL   % of time <70 mg/dL 6  %   % of time >180  Mg/dL 36  %   % of time within range 58   %  Number of hypoglycemia episodes noted: 0     Impression:   CGMS shows correction related hypoglycemia postprandial hyperglycemia no     Recommendation:      Patient was advised to make the following changes in her diabetic regimen  Change carb to insulin ratio with breakfast and lunch to 11: 1

## 2022-06-20 NOTE — PROGRESS NOTES
Kylee Oakes is a 69 y.o. female  seen for follow up of  Uncontrolled  Type 1 Diabetes . Pt was diagnosed with T1Dm in year 1996 she was initaily started on metformin but after a couple of months she was switched to insulin and was on insulin pump for years and then switched to basal bolus regimen as she didn't want a pump with tubing   She got diabetic education in the past .She feels she has been coumnting her carbs accurately  . She has hypoglycemia awareness and her threshold is in the 60's   She has Fibromyalgia and follows with dr Tim Chambers although she feels like she does not have fibromyalgia  She has AAA and follows with Dr Margarita Garcia:    Diabetes  She presents for her follow-up diabetic visit. She has type 1 diabetes mellitus. No MedicAlert identification noted. The initial diagnosis of diabetes was made 23 years ago. Her disease course has been stable. Hypoglycemia symptoms include nervousness/anxiousness, speech difficulty, sweats and tremors. Pertinent negatives for diabetes include no foot ulcerations, no polydipsia, no polyphagia, no polyuria, no visual change, no weakness and no weight loss. There are no hypoglycemic complications. Symptoms are stable. Diabetic complications include peripheral neuropathy. Risk factors for coronary artery disease include diabetes mellitus, dyslipidemia and post-menopausal. Current diabetic treatment includes intensive insulin program. She is compliant with treatment most of the time. Her weight is stable. She is following a generally healthy and diabetic diet. Meal planning includes avoidance of concentrated sweets. She has had a previous visit with a dietitian. She participates in exercise weekly. There is no change in her home blood glucose trend. Her breakfast blood glucose is taken between 8-9 am. Her breakfast blood glucose range is generally 130-140 mg/dl.  Her lunch blood glucose is taken between 12-1 pm. Her lunch blood glucose range is generally 140-180 mg/dl. An ACE inhibitor/angiotensin II receptor blocker is being taken. She does not see a podiatrist.Eye exam is current. Continues to have meal related glycemic excursions,  Denies any unexplained hypoglycemia, denies any excessive alcohol use    Weight trend: stable  Prior visit with dietician: yes  Current diet: on average, 3 meals per day  Current exercise: 3 times a week    Has there been any hospitalization, surgery or major illness since the last visit? No   Has there been any new school, family or social problems since the last visit? No  Has there been any new family history of members with diabetes, heart disease, stroke, or endocrine related problems since the last visit?   No    Past Medical History:   Diagnosis Date    Aortic dissection (HCC)     Arthritis     Chicken pox     Family history of cancer     Family history of coronary artery disease     Fibromyalgia     H/O cardiovascular stress test     HSV-1 infection     Hyperlipidemia     Hypertension     Infertility, female     Iron deficiency     Osteopenia     Postmenopausal bleeding     Rheumatoid arteritis (HCC)     Type 1 diabetes (HCC)     Type 1 diabetes mellitus ([facility] [place] [address]     Vitamin B 12 deficiency     Vitamin D deficiency       Patient Active Problem List   Diagnosis    Fibromyalgia    Arthritis    Type 1 diabetes mellitus without complication (HCC)    Vitamin D deficiency    Hyperlipidemia    Vitamin B 12 deficiency    Aortic dissection, abdominal (HCC)    Chronic midline low back pain without sciatica    Cramp of limb    Hypertension    Lung nodule    Myofascial pain    Myopathy    Osteoarthrosis    Osteopenia    Pain in limb    Restless leg syndrome    Right foot injury    Toe fracture, right    Uncontrolled type 1 diabetes mellitus (HCC)     Past Surgical History:   Procedure Laterality Date    APPENDECTOMY      CATARACT REMOVAL Bilateral 08/2018    DENTAL SURGERY      DENTAL SURGERY 2018    implant     TONSILLECTOMY      TONSILLECTOMY AND ADENOIDECTOMY      TOOTH EXTRACTION      2018    TUBAL LIGATION       Social History     Socioeconomic History    Marital status:      Spouse name: Not on file    Number of children: Not on file    Years of education: Not on file    Highest education level: Not on file   Occupational History    Not on file   Tobacco Use    Smoking status: Never Smoker    Smokeless tobacco: Never Used   Vaping Use    Vaping Use: Never used   Substance and Sexual Activity    Alcohol use: Yes     Comment: occasional beer    Drug use: No    Sexual activity: Not on file   Other Topics Concern    Not on file   Social History Narrative    ** Merged History Encounter **          Social Determinants of Health     Financial Resource Strain:     Difficulty of Paying Living Expenses: Not on file   Food Insecurity:     Worried About [address] in the Last Year: Not on file    Gary of Food in the Last Year: Not on file   Transportation Needs:     Lack of Transportation (Medical): Not on file    Lack of Transportation (Non-Medical):  Not on file   Physical Activity:     Days of Exercise per Week: Not on file    Minutes of Exercise per Session: Not on file   Stress:     Feeling of Stress : Not on file   Social Connections:     Frequency of Communication with Friends and Family: Not on file    Frequency of Social Gatherings with Friends and Family: Not on file    Attends Shinto Services: Not on file    Active Member of Clubs or Organizations: Not on file    Attends Club or Organization Meetings: Not on file    Marital Status: Not on file   Intimate Partner Violence:     Fear of Current or Ex-Partner: Not on file    Emotionally Abused: Not on file    Physically Abused: Not on file    Sexually Abused: Not on file   Housing Stability:     Unable to Pay for Housing in the Last Year: Not on file    Number of [place] in the Last Year: Not on file    Unstable Housing in the Last Year: Not on file     Family History   Problem Relation Age of Onset    Heart Disease Mother     High Blood Pressure Mother     Arthritis Mother     Heart Attack Father     Breast Cancer Sister     High Blood Pressure Brother     Coronary Art Dis Mother     Osteoporosis Mother     Coronary Art Dis Father     High Blood Pressure Father     Emphysema Father     Cancer Sister     Stroke Maternal Grandmother      Current Outpatient Medications   Medication Sig Dispense Refill    candesartan (ATACAND) 8 MG tablet       blood glucose test strips (ONETOUCH VERIO) strip TEST FOUR TIMES A  strip 5    insulin lispro (HUMALOG) 100 UNIT/ML SOLN injection vial INJECT UP  UNITS VIA INSULIN PUMP DAILY 30 mL 0    metFORMIN (GLUCOPHAGE-XR) 500 MG extended release tablet TAKE ONE TABLET BY MOUTH TWICE A DAY 60 tablet 3    Continuous Blood Gluc Transmit (DEXCOM G6 TRANSMITTER) MISC To check glucose change one every 3 months 1 each 3    Continuous Blood Gluc Sensor (DEXCOM G6 SENSOR) MISC Change every 10 days 3 each 5    Insulin Disposable Pump (OMNIPOD DASH 5 PACK PODS) MISC CHANGE EVERY 3 DAYS 10 each 5    diclofenac 1 % CREA Place onto the skin as needed for Pain      IRON PO Take 65 mg by mouth      Blood Glucose Monitoring Suppl (ONETOUCH VERIO FLEX SYSTEM) w/Device KIT by Does not apply route      aspirin 81 MG chewable tablet Take 81 mg by mouth      vitamin D (CHOLECALCIFEROL) 1000 units TABS tablet Take 1,000 Units by mouth      cyanocobalamin ([employer] VITAMIN B12) 1000 MCG tablet Take 1,000 mcg by mouth      pramipexole (MIRAPEX) 0.25 MG tablet Take 0.5 mg by mouth Pt takes 0.5 mg daily      simvastatin (ZOCOR) 10 MG tablet        No current facility-administered medications for this visit.      Allergies   Allergen Reactions    Allevess [Capsaicin-Menthol]      Pt denies 10/18/18    Lisinopril Other (See Comments)     Low BP     Morphine Pt denies 18    Naproxen Sodium Hives    Nitroglycerin      Low blood pressure, pt denies 18     Family Status   Relation Name Status    Mother  Alive    Father      Sister  Alive    Brother  Alive    Sister  Alive    MGM  (Not Specified)         ROS    I have reviewed the review of system questionnaire filled by the patient .   Patient was advised to contact PCP for non endocrine signs and symptoms       OBJECTIVE:  /64   Pulse 75   Resp 16   Ht 5' 5\" (1.651 m)   Wt 139 lb (63 kg)   BMI 23.13 kg/m²    Wt Readings from Last 3 Encounters:   22 139 lb (63 kg)   22 142 lb (64.4 kg)   21 140 lb (63.5 kg)     Constitutional: no acute distress, well appearing, well nourished  Psychiatric: oriented to person, place and time, judgement, insight and normal, recent and remote memory and intact and mood, affect are normal  Skin: skin and subcutaneous tissue is normal without mass,   Head and Face: examination of head and face revealed no abnormalities  Eyes: no lid or conjunctival swelling, no erythema or discharge, pupils are normal,   Ears/Nose: external inspection of ears and nose revealed no abnormalities, hearing is grossly normal  Oropharynx/Mouth/Face: lips, tongue and gums are normal with no lesions, the voice quality was normal  Neck: neck is supple and symmetric, with midline trachea and no masses, thyroid is normal    Pulmonary: no increased work of breathing or signs of respiratory distress, lungs are clear to auscultation  Cardiovascular: normal heart rate and rhythm, normal S1 and S2,   Musculoskeletal: normal gait and station,   Neurological: normal coordination, normal general cortical function          Lab Results   Component Value Date    LABA1C 7.6 2022    LABA1C 8.0 2022    LABA1C 8.0 06/15/2021         ASSESSMENT/PLAN:      Uncontrolled TYPE 1 DIABETES WITH COMPLICATIONS -- 8.1>>3.5 > 7.9>.8.5>>7.9>>7.5>>8.4>>8.5 >>8.5>>8.0>>7.5>>7.1>>8.3>>7.9>>8.4>>8.8>>8.4>>8.0>>8.5>>8.0>>7.6      A1c stable not at target. She has meal related hyperglycemia  I have reviewed her Dexcom and OmniPod data with her in detail  Most excursions are meal related, we discussed that she can take half a bolus before eating and have a bolus after finishing her meal to see if that better covers are meal related excursions. We also discussed the use of Symlin she is not interested in taking an injection with each meal.  She plans on going to [place] in March and had questions about Dexcom I told her the accuracy of the sensor might be affected if she goes through airport screening  Have made the following changes  CIR she will take split dose     She was again advised to take her meal boluses 10 minutes before she eats she still struggles with that   She was advised to avoid snacks at bedtime especially if the blood sugars are already high     Met with diabetes educator oct 2018  And is familiar with carb counting     ----she will continue with metformin 500 mg bid   --victoza gave extreme nausea so stopped     Health maintenance  --eye exam no retinopathy no retinopathy oct 2019   --feet exam examianed ---discussed feet care    last microalbumin to creatinine ratio was normal in September 2021 with a level of 4.8     --- Thyroid normal on palpation No discreet thyroid nodules identified on exam     Abdominal aortic aneurysm   Stable imaging in 2014   Last imaging in 2017   She is seeing vascular Dr Juan David Andino     -- stress test and visit with cards with no CAD     Low WBS   Follows with hematology     Hyperlipidemia   She is now 10 mg of simva ldl was 40   She has strong family hx of CAD despite good cholesterol.     HYPERTENSION    well controlled on the current regimen   Continue with current regimen   Mitral valve prolapse     POSTMENOPAUSAL   Ob /gyn has been ordering dexa    she doesn't want to take medication for osteoporosis she does have family history of osteoporosis with her mom having osteoporosis. FIBROMYALGIA   Stable follows with Dr Irene Baldwin   She had dexa scan done as per pt within last 1-2 years and is normal       Reviewed and/or ordered clinical lab results Yes  Reviewed and/or ordered radiology tests Yes  Reviewed and/or ordered other diagnostic tests Yes  Made a decision to obtain old records Yes  Reviewed and summarized old records Yes    Aiden Wooten was counseled regarding symptoms of current diagnosis, course and complications of disease if inadequately treated, side effects of medications, diagnosis, treatment options, and prognosis, risks, benefits, complications, and alternatives of treatment, labs, imaging and other studies and treatment targets and goals. She understands instructions and counseling. These diagnosis were discussed and reviewed with the patient including the advantages of drug therapy. She was counseled at this visit on the following: diabetes complication prevention and foot care. Return in about 3 months (around 9/20/2022). Diabetes Continuous Glucose Monitoring Report         Reason for Study:     - improve diabetic control without risk of hypoglycemia       Current Medication regimen:   omnipod      CGMS Report     CGMS data collection was performed on june 20 , 2022  Patient provided information on her  diet, activities and insulin dosing  during this period. Data was available for 14   days     Sensor Data Report:   - 12 AM to 6 AM: Overnight blood glucose pattern shows elevated   - 6   AM to 10 AM:  Post breakfast hyperglycemia  is noted  --10AM to 5 PM : Occasional hypoglycemia observed during this time.   - 5   PM to 8 PM: Post meal hyperglycemia  is noted       Average reading 197  mg/dL     Standard Dev  70  mg/dL   % of time <70 mg/dL 2  %   % of time >180  Mg/dL 56  %   % of time within range 42  %  Number of hypoglycemia episodes noted: 0     Impression:   CGMS shows correction related hypoglycemia postprandial hyperglycemia no     Recommendation:      Patient was advised to make the following changes in her diabetic regimen  Patient will try split dose bolusing with meals. I also recommended closed-loop system which she is not interested in as she does not want a pump with tubing.

## 2022-06-27 DIAGNOSIS — E10.649 UNCONTROLLED TYPE 1 DIABETES MELLITUS WITH HYPOGLYCEMIA WITHOUT COMA (HCC): ICD-10-CM

## 2022-06-27 RX ORDER — INSULIN LISPRO 100 [IU]/ML
INJECTION, SOLUTION INTRAVENOUS; SUBCUTANEOUS
Qty: 30 ML | Refills: 3 | Status: SHIPPED | OUTPATIENT
Start: 2022-06-27

## 2022-07-20 ENCOUNTER — TELEPHONE (OUTPATIENT)
Dept: ENDOCRINOLOGY | Age: 67
End: 2022-07-20

## 2022-08-04 NOTE — TELEPHONE ENCOUNTER
PA approval faxed to [facility]. Physician Pre-Sedation Assessment    Pre-Sedation Assessment:    Sedation History: Airway Assessed    Cardiac: normal S1, S2  Respiratory: breath sounds clear bilaterally   Abdomen: soft, BS (+), non-tender    ASA Classification: 2.  Patient with mild systemic disease    Plan: MAC sedation

## 2022-08-14 DIAGNOSIS — E10.9 TYPE 1 DIABETES MELLITUS WITHOUT COMPLICATION (HCC): ICD-10-CM

## 2022-08-15 RX ORDER — INSULIN PUMP CONTROLLER
EACH MISCELLANEOUS
Qty: 10 EACH | Refills: 5 | Status: SHIPPED | OUTPATIENT
Start: 2022-08-15

## 2022-09-13 DIAGNOSIS — E10.641 UNCONTROLLED TYPE 1 DIABETES MELLITUS WITH HYPOGLYCEMIA AND COMA (HCC): ICD-10-CM

## 2022-09-13 RX ORDER — METFORMIN HYDROCHLORIDE 500 MG/1
TABLET, EXTENDED RELEASE ORAL
Qty: 60 TABLET | Refills: 3 | Status: SHIPPED | OUTPATIENT
Start: 2022-09-13

## 2022-10-10 DIAGNOSIS — E10.65 UNCONTROLLED TYPE 1 DIABETES MELLITUS WITH HYPERGLYCEMIA (HCC): ICD-10-CM

## 2022-10-10 LAB
A/G RATIO: 2.1 (ref 1.1–2.2)
ALBUMIN SERPL-MCNC: 4.4 G/DL (ref 3.4–5)
ALP BLD-CCNC: 71 U/L (ref 40–129)
ALT SERPL-CCNC: 13 U/L (ref 10–40)
ANION GAP SERPL CALCULATED.3IONS-SCNC: 7 MMOL/L (ref 3–16)
AST SERPL-CCNC: 17 U/L (ref 15–37)
BILIRUB SERPL-MCNC: 0.7 MG/DL (ref 0–1)
BUN BLDV-MCNC: 17 MG/DL (ref 7–20)
CALCIUM SERPL-MCNC: 9.8 MG/DL (ref 8.3–10.6)
CHLORIDE BLD-SCNC: 103 MMOL/L (ref 99–110)
CHOLESTEROL, TOTAL: 158 MG/DL (ref 0–199)
CO2: 30 MMOL/L (ref 21–32)
CREAT SERPL-MCNC: 0.6 MG/DL (ref 0.6–1.2)
CREATININE URINE: 87.5 MG/DL (ref 28–259)
GFR AFRICAN AMERICAN: >60
GFR NON-AFRICAN AMERICAN: >60
GLUCOSE BLD-MCNC: 123 MG/DL (ref 70–99)
HDLC SERPL-MCNC: 102 MG/DL (ref 40–60)
LDL CHOLESTEROL CALCULATED: 44 MG/DL
MICROALBUMIN UR-MCNC: <1.2 MG/DL
MICROALBUMIN/CREAT UR-RTO: NORMAL MG/G (ref 0–30)
POTASSIUM SERPL-SCNC: 4.7 MMOL/L (ref 3.5–5.1)
SODIUM BLD-SCNC: 140 MMOL/L (ref 136–145)
TOTAL PROTEIN: 6.5 G/DL (ref 6.4–8.2)
TRIGL SERPL-MCNC: 60 MG/DL (ref 0–150)
TSH SERPL DL<=0.05 MIU/L-ACNC: 2.97 UIU/ML (ref 0.27–4.2)
VLDLC SERPL CALC-MCNC: 12 MG/DL

## 2022-10-11 ENCOUNTER — TELEPHONE (OUTPATIENT)
Dept: ENDOCRINOLOGY | Age: 67
End: 2022-10-11

## 2022-10-11 DIAGNOSIS — E10.65 UNCONTROLLED TYPE 1 DIABETES MELLITUS WITH HYPERGLYCEMIA (HCC): ICD-10-CM

## 2022-10-11 LAB
ESTIMATED AVERAGE GLUCOSE: 165.7 MG/DL
HBA1C MFR BLD: 7.4 %

## 2022-10-11 RX ORDER — BLOOD-GLUCOSE SENSOR
EACH MISCELLANEOUS
Qty: 3 EACH | Refills: 5 | Status: SHIPPED | OUTPATIENT
Start: 2022-10-11

## 2022-10-17 ENCOUNTER — TELEMEDICINE (OUTPATIENT)
Dept: ENDOCRINOLOGY | Age: 67
End: 2022-10-17
Payer: MEDICARE

## 2022-10-17 DIAGNOSIS — I71.02 AORTIC DISSECTION, ABDOMINAL (HCC): ICD-10-CM

## 2022-10-17 DIAGNOSIS — E10.65 UNCONTROLLED TYPE 1 DIABETES MELLITUS WITH HYPERGLYCEMIA (HCC): Primary | ICD-10-CM

## 2022-10-17 DIAGNOSIS — I10 ESSENTIAL HYPERTENSION: ICD-10-CM

## 2022-10-17 PROCEDURE — 99214 OFFICE O/P EST MOD 30 MIN: CPT | Performed by: INTERNAL MEDICINE

## 2022-10-17 PROCEDURE — 95251 CONT GLUC MNTR ANALYSIS I&R: CPT | Performed by: INTERNAL MEDICINE

## 2022-10-17 PROCEDURE — 1123F ACP DISCUSS/DSCN MKR DOCD: CPT | Performed by: INTERNAL MEDICINE

## 2022-10-17 PROCEDURE — 3051F HG A1C>EQUAL 7.0%<8.0%: CPT | Performed by: INTERNAL MEDICINE

## 2022-10-17 RX ORDER — INSULIN PMP CART,AUT,G6/7,CNTR
EACH SUBCUTANEOUS
Qty: 1 KIT | Refills: 0 | Status: SHIPPED | OUTPATIENT
Start: 2022-10-17

## 2022-10-17 RX ORDER — INSULIN PMP CART,AUT,G6/7,CNTR
EACH SUBCUTANEOUS
Qty: 30 EACH | Refills: 5 | Status: SHIPPED | OUTPATIENT
Start: 2022-10-17

## 2022-10-17 ASSESSMENT — ENCOUNTER SYMPTOMS: VISUAL CHANGE: 0

## 2022-10-17 NOTE — PROGRESS NOTES
Cee Hernandez is a 69 y.o. female  seen for follow up of  Uncontrolled  Type 1 Diabetes . Pt was diagnosed with T1Dm in year 1996 she was initaily started on metformin but after a couple of months she was switched to insulin and was on insulin pump for years and then switched to basal bolus regimen as she didn't want a pump with tubing   She got diabetic education in the past .She feels she has been coumnting her carbs accurately  . She has hypoglycemia awareness and her threshold is in the 60's   She has Fibromyalgia and follows with dr Andrei Lee although she feels like she does not have fibromyalgia  She has AAA and follows with vascular surgery. INTERIM:    Diabetes  She presents for her follow-up diabetic visit. She has type 1 diabetes mellitus. No MedicAlert identification noted. The initial diagnosis of diabetes was made 23 years ago. Her disease course has been stable. Hypoglycemia symptoms include nervousness/anxiousness, speech difficulty, sweats and tremors. Pertinent negatives for diabetes include no foot ulcerations, no polydipsia, no polyphagia, no polyuria, no visual change, no weakness and no weight loss. There are no hypoglycemic complications. Symptoms are stable. Diabetic complications include peripheral neuropathy. Risk factors for coronary artery disease include diabetes mellitus, dyslipidemia and post-menopausal. Current diabetic treatment includes intensive insulin program. She is compliant with treatment most of the time. Her weight is stable. She is following a generally healthy and diabetic diet. Meal planning includes avoidance of concentrated sweets. She has had a previous visit with a dietitian. She participates in exercise weekly. There is no change in her home blood glucose trend. Her breakfast blood glucose is taken between 8-9 am. Her breakfast blood glucose range is generally 130-140 mg/dl.  Her lunch blood glucose is taken between 12-1 pm. Her lunch blood glucose range is generally 140-180 mg/dl. An ACE inhibitor/angiotensin II receptor blocker is being taken. She does not see a podiatrist.Eye exam is current. Denies any hypoglycemia, notes improvement in glucose values lately. Weight trend: stable  Prior visit with dietician: yes  Current diet: on average, 3 meals per day  Current exercise: 3 times a week    Has there been any hospitalization, surgery or major illness since the last visit? No   Has there been any new school, family or social problems since the last visit? No  Has there been any new family history of members with diabetes, heart disease, stroke, or endocrine related problems since the last visit?   No    Past Medical History:   Diagnosis Date    Aortic dissection (HCC)     Arthritis     Chicken pox     Family history of cancer     Family history of coronary artery disease     Fibromyalgia     H/O cardiovascular stress test     HSV-1 infection     Hyperlipidemia     Hypertension     Infertility, female     Iron deficiency     Osteopenia     Postmenopausal bleeding     Rheumatoid arteritis ([address]     Type 1 diabetes (HCC)     Type 1 diabetes mellitus ([address]     Vitamin B 12 deficiency     Vitamin D deficiency       Patient Active Problem List   Diagnosis    Fibromyalgia    Arthritis    Type 1 diabetes mellitus without complication ([address]    Vitamin D deficiency    Hyperlipidemia    Vitamin B 12 deficiency    Aortic dissection, abdominal (HCC)    Chronic midline low back pain without sciatica    Cramp of limb    Hypertension    Lung nodule    Myofascial pain    Myopathy    Osteoarthrosis    Osteopenia    Pain in limb    Restless leg syndrome    Right foot injury    Toe fracture, right    Uncontrolled type 1 diabetes mellitus     Past Surgical History:   Procedure Laterality Date    APPENDECTOMY      CATARACT REMOVAL Bilateral 08/2018    DENTAL SURGERY      DENTAL SURGERY  2018    implant     TONSILLECTOMY      TONSILLECTOMY AND ADENOIDECTOMY      TOOTH EXTRACTION 2018    TUBAL LIGATION       Social History     Socioeconomic History    Marital status:      Spouse name: Not on file    Number of children: Not on file    Years of education: Not on file    Highest education level: Not on file   Occupational History    Not on file   Tobacco Use    Smoking status: Never    Smokeless tobacco: Never   Vaping Use    Vaping Use: Never used   Substance and Sexual Activity    Alcohol use: Yes     Comment: occasional beer    Drug use: No    Sexual activity: Not on file   Other Topics Concern    Not on file   Social History Narrative    ** Merged History Encounter **          Social Determinants of Health     Financial Resource Strain: Not on file   Food Insecurity: Not on file   Transportation Needs: Not on file   Physical Activity: Not on file   Stress: Not on file   Social Connections: Not on file   Intimate Partner Violence: Not on file   Housing Stability: Not on file     Family History   Problem Relation Age of Onset    Heart Disease Mother     High Blood Pressure Mother     Arthritis Mother     Heart Attack Father     Breast Cancer Sister     High Blood Pressure Brother     Coronary Art Dis Mother     Osteoporosis Mother     Coronary Art Dis Father     High Blood Pressure Father     Emphysema Father     Cancer Sister     Stroke Maternal Grandmother      Current Outpatient Medications   Medication Sig Dispense Refill    Insulin Disposable Pump (OMNIPOD 5 G6 INTRO, GEN 5,) KIT Use to administer insulin 1 kit 0    Insulin Disposable Pump (OMNIPOD 5 G6 POD, GEN 5,) MISC Change every 3 days 30 each 5    Continuous Blood Gluc Sensor (DEXCOM G6 SENSOR) MISC Change every 10 days 3 each 5    metFORMIN (GLUCOPHAGE-XR) 500 MG extended release tablet TAKE ONE TABLET BY MOUTH TWICE A DAY 60 tablet 3    Insulin Disposable Pump (OMNIPOD DASH PODS, GEN 4,) MISC CHANGE EVERY 3 DAYS 10 each 5    insulin lispro (HUMALOG) 100 UNIT/ML SOLN injection vial INJECT UP  UNITS VIA INSULIN PUMP DAILY 30 mL 3    candesartan (ATACAND) 8 MG tablet Take 8 mg by mouth daily      blood glucose test strips (ONETOUCH VERIO) strip TEST FOUR TIMES A  strip 5    Continuous Blood Gluc Transmit (DEXCOM G6 TRANSMITTER) MISC To check glucose change one every 3 months 1 each 3    diclofenac 1 % CREA Place onto the skin as needed for Pain      IRON PO Take 65 mg by mouth      Blood Glucose Monitoring Suppl (ONETOUCH VERIO FLEX SYSTEM) w/Device KIT by Does not apply route      aspirin 81 MG chewable tablet Take 81 mg by mouth      vitamin D (CHOLECALCIFEROL) 1000 units TABS tablet Take 1,000 Units by mouth      cyanocobalamin 1000 MCG tablet Take 1,000 mcg by mouth      pramipexole (MIRAPEX) 0.25 MG tablet Take 0.5 mg by mouth Pt takes 0.5 mg daily      simvastatin (ZOCOR) 10 MG tablet        No current facility-administered medications for this visit. Allergies   Allergen Reactions    Allevess [Capsaicin-Menthol]      Pt denies 10/18/18    Lisinopril Other (See Comments)     Low BP     Morphine      Pt denies 18    Naproxen Sodium Hives    Nitroglycerin      Low blood pressure, pt denies 18     Family Status   Relation Name Status    Mother  Alive    Father      Sister  Alive    Brother  Alive    Sister  Alive    MGM  (Not Specified)         OBJECTIVE:  There were no vitals taken for this visit.    Wt Readings from Last 3 Encounters:   22 139 lb (63 kg)   22 142 lb (64.4 kg)   21 140 lb (63.5 kg)     Constitutional: no acute distress, well appearing, well nourished    Constitutional: no acute distress, well appearing and well nourished  Psychiatric: oriented to person, place and time, judgement and insight and normal, recent and remote memory intact and mood and affect are normal  Skin: skin and subcutaneous tissue is normal without visible mass,   Head and Face: visual inspection  of head and face revealed no abnormalities  Eyes: visual inspection showed no lid or conjunctival swelling, erythema or discharge, pupils are normal, equal, round  Ears/Nose: external inspection of ears and nose revealed no abnormalities, hearing is grossly normal  Oropharynx/Mouth/Face: lips, tongue and gums appear  normal with no lesions, the voice quality was normal  Neck: neck appears symmetric, with no visible masses,   Pulmonary: no increased work of breathing or signs of respiratory distress,  Musculoskeletal: normal on inspection    Neurological: normal coordination and normal general cortical function        Lab Results   Component Value Date/Time    LABA1C 7.4 10/10/2022 08:17 AM    LABA1C 7.6 06/13/2022 08:43 AM    LABA1C 8.0 02/07/2022 08:10 AM         ASSESSMENT/PLAN:      Uncontrolled TYPE 1 DIABETES WITH COMPLICATIONS -- 3.0>>9.3 > 7.9>.8.5>>7.9>>7.5>>8.4>>8.5 >>8.5>>8.0>>7.5>>7.1>>8.3>>7.9>>8.4>>8.8>>8.4>>8.0>>8.5>>8.0>>7.6    A1c improved but still not at target A1c 7.4  She has meal related hyperglycemia  I have reviewed her Dexcom and OmniPod data with her in detail  Most excursions are meal related, we discussed that she can take half a bolus before eating and have a bolus after finishing her meal to see if that better covers are meal related excursions. Switch to OmniPod 5  Stop metformin.     She was again advised to take her meal boluses 10 minutes before she eats she still struggles with that   She was advised to avoid snacks at bedtime especially if the blood sugars are already high     Met with diabetes educator oct 2018  And is familiar with carb counting     ----she will continue with metformin 500 mg bid   --victoza gave extreme nausea so stopped     Health maintenance  --eye exam no retinopathy no retinopathy jan 2022  --feet exam examianed ---discussed feet care    last microalbumin to creatinine ratio was normal in September 2021 with a level of 4.8     --- Thyroid normal on palpation No discreet thyroid nodules identified on exam     Abdominal aortic aneurysm   Stable imaging in 2014   Last imaging in 2017   She is seeing vascular Dr Afshan Moore     -- stress test and visit with cards with no CAD     Low WBS   Follows with hematology     Hyperlipidemia   She is now 10 mg of simva ldl is at target   she has strong family hx of CAD despite good cholesterol. HYPERTENSION    well controlled on the current regimen   Continue with current regimen   Mitral valve prolapse     POSTMENOPAUSAL   Ob /gyn has been ordering dexa    she doesn't want to take medication for osteoporosis she does have family history of osteoporosis with her mom having osteoporosis. FIBROMYALGIA   Stable follows with Dr Kirby Correa   She had dexa scan done as per pt within last 1-2 years and is normal       Reviewed and/or ordered clinical lab results Yes  Reviewed and/or ordered radiology tests Yes  Reviewed and/or ordered other diagnostic tests Yes  Made a decision to obtain old records Yes  Reviewed and summarized old records Yes    Rafa Patton was counseled regarding symptoms of current diagnosis, course and complications of disease if inadequately treated, side effects of medications, diagnosis, treatment options, and prognosis, risks, benefits, complications, and alternatives of treatment, labs, imaging and other studies and treatment targets and goals. She understands instructions and counseling. These diagnosis were discussed and reviewed with the patient including the advantages of drug therapy. She was counseled at this visit on the following: diabetes complication prevention and foot care. Return in about 3 months (around 1/17/2023). Diabetes Continuous Glucose Monitoring Report         Reason for Study:     - improve diabetic control without risk of hypoglycemia       Current Medication regimen:   omnipod      CGMS Report     CGMS data collection was performed on October 17, 2022  Patient provided information on her  diet, activities and insulin dosing  during this period.    Data was available for 14   days     Sensor Data Report:   - 12 AM to 6 AM: Overnight blood glucose pattern shows elevated   - 6   AM to 10 AM:  Post breakfast hyperglycemia  is noted  --10AM to 5 PM : Occasional hypoglycemia observed during this time. - 5   PM to 8 PM: Post meal hyperglycemia  is noted       Average reading 182 mg/dL     Standard Dev 75 mg/dL   % of time <70 mg/dL 5 %   % of time >180  Mg/dL 48 %   % of time within range 47   %  Number of hypoglycemia episodes noted: 0     Impression:   CGMS shows correction related hypoglycemia postprandial hyperglycemia no     Recommendation:      Patient was advised to make the following changes in her diabetic regimen  Change carb to insulin ratio with breakfast and lunch to 11: 1  We will switch her to OmniPod 5 insulin pump and start the closed-loop system.

## 2022-10-18 ENCOUNTER — TELEPHONE (OUTPATIENT)
Dept: ENDOCRINOLOGY | Age: 67
End: 2022-10-18

## 2022-10-18 NOTE — TELEPHONE ENCOUNTER
Kit:  Key: [id number] - PA Case ID: [id number] - Rx #: [id number]  Approved today  PA Case: [id number], Status: Approved, Coverage Starts on: 7/19/2022 12:00:00 AM, Coverage Ends on: 10/18/2023 12:00:00 AM.

## 2023-02-21 DIAGNOSIS — E10.65 UNCONTROLLED TYPE 1 DIABETES MELLITUS WITH HYPERGLYCEMIA (HCC): ICD-10-CM

## 2023-02-21 DIAGNOSIS — I10 ESSENTIAL HYPERTENSION: ICD-10-CM

## 2023-02-21 DIAGNOSIS — I71.02 AORTIC DISSECTION, ABDOMINAL (HCC): ICD-10-CM

## 2023-02-21 LAB
A/G RATIO: 2 (ref 1.1–2.2)
ALBUMIN SERPL-MCNC: 4.3 G/DL (ref 3.4–5)
ALP BLD-CCNC: 64 U/L (ref 40–129)
ALT SERPL-CCNC: 14 U/L (ref 10–40)
ANION GAP SERPL CALCULATED.3IONS-SCNC: 9 MMOL/L (ref 3–16)
AST SERPL-CCNC: 19 U/L (ref 15–37)
BILIRUB SERPL-MCNC: 0.6 MG/DL (ref 0–1)
BUN BLDV-MCNC: 21 MG/DL (ref 7–20)
CALCIUM SERPL-MCNC: 10.1 MG/DL (ref 8.3–10.6)
CHLORIDE BLD-SCNC: 101 MMOL/L (ref 99–110)
CHOLESTEROL, TOTAL: 183 MG/DL (ref 0–199)
CO2: 26 MMOL/L (ref 21–32)
CREAT SERPL-MCNC: 0.7 MG/DL (ref 0.6–1.2)
CREATININE URINE: 207.2 MG/DL (ref 28–259)
ESTIMATED AVERAGE GLUCOSE: 162.8 MG/DL
GFR SERPL CREATININE-BSD FRML MDRD: >60 ML/MIN/{1.73_M2}
GLUCOSE BLD-MCNC: 181 MG/DL (ref 70–99)
HBA1C MFR BLD: 7.3 %
HDLC SERPL-MCNC: 114 MG/DL (ref 40–60)
LDL CHOLESTEROL CALCULATED: 57 MG/DL
MICROALBUMIN UR-MCNC: <1.2 MG/DL
MICROALBUMIN/CREAT UR-RTO: NORMAL MG/G (ref 0–30)
POTASSIUM SERPL-SCNC: 4.9 MMOL/L (ref 3.5–5.1)
SODIUM BLD-SCNC: 136 MMOL/L (ref 136–145)
TOTAL PROTEIN: 6.5 G/DL (ref 6.4–8.2)
TRIGL SERPL-MCNC: 62 MG/DL (ref 0–150)
TSH SERPL DL<=0.05 MIU/L-ACNC: 2.71 UIU/ML (ref 0.27–4.2)
VLDLC SERPL CALC-MCNC: 12 MG/DL

## 2023-02-24 ENCOUNTER — TELEPHONE (OUTPATIENT)
Dept: ENDOCRINOLOGY | Age: 68
End: 2023-02-24

## 2023-02-27 ENCOUNTER — OFFICE VISIT (OUTPATIENT)
Dept: ENDOCRINOLOGY | Age: 68
End: 2023-02-27
Payer: MEDICARE

## 2023-02-27 VITALS
DIASTOLIC BLOOD PRESSURE: 63 MMHG | HEART RATE: 73 BPM | SYSTOLIC BLOOD PRESSURE: 166 MMHG | HEIGHT: 65 IN | WEIGHT: 146 LBS | BODY MASS INDEX: 24.32 KG/M2 | RESPIRATION RATE: 16 BRPM

## 2023-02-27 DIAGNOSIS — E55.9 VITAMIN D DEFICIENCY: ICD-10-CM

## 2023-02-27 DIAGNOSIS — M79.7 FIBROMYALGIA: ICD-10-CM

## 2023-02-27 DIAGNOSIS — E78.2 MIXED HYPERLIPIDEMIA: ICD-10-CM

## 2023-02-27 DIAGNOSIS — I10 PRIMARY HYPERTENSION: ICD-10-CM

## 2023-02-27 DIAGNOSIS — E10.65 UNCONTROLLED TYPE 1 DIABETES MELLITUS WITH HYPERGLYCEMIA (HCC): Primary | ICD-10-CM

## 2023-02-27 DIAGNOSIS — M15.9 PRIMARY OSTEOARTHRITIS INVOLVING MULTIPLE JOINTS: ICD-10-CM

## 2023-02-27 PROCEDURE — 99214 OFFICE O/P EST MOD 30 MIN: CPT | Performed by: INTERNAL MEDICINE

## 2023-02-27 PROCEDURE — 3078F DIAST BP <80 MM HG: CPT | Performed by: INTERNAL MEDICINE

## 2023-02-27 PROCEDURE — 3051F HG A1C>EQUAL 7.0%<8.0%: CPT | Performed by: INTERNAL MEDICINE

## 2023-02-27 PROCEDURE — 1123F ACP DISCUSS/DSCN MKR DOCD: CPT | Performed by: INTERNAL MEDICINE

## 2023-02-27 PROCEDURE — 3077F SYST BP >= 140 MM HG: CPT | Performed by: INTERNAL MEDICINE

## 2023-02-27 PROCEDURE — 95251 CONT GLUC MNTR ANALYSIS I&R: CPT | Performed by: INTERNAL MEDICINE

## 2023-02-27 RX ORDER — BLOOD-GLUCOSE TRANSMITTER
EACH MISCELLANEOUS
Qty: 1 EACH | Refills: 3 | Status: SHIPPED | OUTPATIENT
Start: 2023-02-27

## 2023-02-27 RX ORDER — BLOOD-GLUCOSE SENSOR
EACH MISCELLANEOUS
Qty: 3 EACH | Refills: 5 | Status: SHIPPED | OUTPATIENT
Start: 2023-02-27

## 2023-02-27 ASSESSMENT — ENCOUNTER SYMPTOMS: VISUAL CHANGE: 0

## 2023-02-27 NOTE — PROGRESS NOTES
Shamar Agrawal is a 77 y.o. female  seen for follow up of  Uncontrolled  Type 1 Diabetes . Pt was diagnosed with T1Dm in year 1996 she was initaily started on metformin but after a couple of months she was switched to insulin and was on insulin pump for years and then switched to basal bolus regimen as she didn't want a pump with tubing   She got diabetic education in the past .She feels she has been coumnting her carbs accurately  . She has hypoglycemia awareness and her threshold is in the 60's   She has Fibromyalgia and follows with dr Elliot Rodriguez although she feels like she does not have fibromyalgia  She has AAA and follows with vascular surgery. INTERIM:    Diabetes  She presents for her follow-up diabetic visit. She has type 1 diabetes mellitus. No MedicAlert identification noted. The initial diagnosis of diabetes was made 23 years ago. Her disease course has been stable. Hypoglycemia symptoms include nervousness/anxiousness, speech difficulty, sweats and tremors. Pertinent negatives for diabetes include no foot ulcerations, no polydipsia, no polyphagia, no polyuria, no visual change, no weakness and no weight loss. There are no hypoglycemic complications. Symptoms are stable. Diabetic complications include peripheral neuropathy. Risk factors for coronary artery disease include diabetes mellitus, dyslipidemia and post-menopausal. Current diabetic treatment includes intensive insulin program. She is compliant with treatment most of the time. Her weight is stable. She is following a generally healthy and diabetic diet. Meal planning includes avoidance of concentrated sweets. She has had a previous visit with a dietitian. She participates in exercise weekly. There is no change in her home blood glucose trend. Her breakfast blood glucose is taken between 8-9 am. Her breakfast blood glucose range is generally 130-140 mg/dl.  Her lunch blood glucose is taken between 12-1 pm. Her lunch blood glucose range is generally 140-180 mg/dl. An ACE inhibitor/angiotensin II receptor blocker is being taken. She does not see a podiatrist.Eye exam is current. Patient denies any hypoglycemia, notes improvement in glucose values lately. Weight trend: stable  Prior visit with dietician: yes  Current diet: on average, 3 meals per day  Current exercise: 3 times a week    Has there been any hospitalization, surgery or major illness since the last visit? No   Has there been any new school, family or social problems since the last visit? No  Has there been any new family history of members with diabetes, heart disease, stroke, or endocrine related problems since the last visit?   No    Past Medical History:   Diagnosis Date    Aortic dissection (HCC)     Arthritis     Chicken pox     Family history of cancer     Family history of coronary artery disease     Fibromyalgia     H/O cardiovascular stress test     HSV-1 infection     Hyperlipidemia     Hypertension     Infertility, female     Iron deficiency     Osteopenia     Postmenopausal bleeding     Rheumatoid arteritis ([address]     Type 1 diabetes (HCC)     Type 1 diabetes mellitus ([address]     Vitamin B 12 deficiency     Vitamin D deficiency       Patient Active Problem List   Diagnosis    Fibromyalgia    Arthritis    Type 1 diabetes mellitus without complication ([address]    Vitamin D deficiency    Hyperlipidemia    Vitamin B 12 deficiency    Aortic dissection, abdominal (HCC)    Chronic midline low back pain without sciatica    Cramp of limb    Hypertension    Lung nodule    Myofascial pain    Myopathy    Osteoarthrosis    Osteopenia    Pain in limb    Restless leg syndrome    Right foot injury    Toe fracture, right    Uncontrolled type 1 diabetes mellitus     Past Surgical History:   Procedure Laterality Date    APPENDECTOMY      CATARACT REMOVAL Bilateral 08/2018    DENTAL SURGERY      DENTAL SURGERY  2018    implant     TONSILLECTOMY      TONSILLECTOMY AND ADENOIDECTOMY      TOOTH EXTRACTION      2018    TUBAL LIGATION       Social History     Socioeconomic History    Marital status:      Spouse name: Not on file    Number of children: Not on file    Years of education: Not on file    Highest education level: Not on file   Occupational History    Not on file   Tobacco Use    Smoking status: Never    Smokeless tobacco: Never   Vaping Use    Vaping Use: Never used   Substance and Sexual Activity    Alcohol use: Yes     Comment: occasional beer    Drug use: No    Sexual activity: Not on file   Other Topics Concern    Not on file   Social History Narrative    ** Merged History Encounter **          Social Determinants of Health     Financial Resource Strain: Not on file   Food Insecurity: Not on file   Transportation Needs: Not on file   Physical Activity: Not on file   Stress: Not on file   Social Connections: Not on file   Intimate Partner Violence: Not on file   Housing Stability: Not on file     Family History   Problem Relation Age of Onset    Heart Disease Mother     High Blood Pressure Mother     Arthritis Mother     Heart Attack Father     Breast Cancer Sister     High Blood Pressure Brother     Coronary Art Dis Mother     Osteoporosis Mother     Coronary Art Dis Father     High Blood Pressure Father     Emphysema Father     Cancer Sister     Stroke Maternal Grandmother      Current Outpatient Medications   Medication Sig Dispense Refill    Continuous Blood Gluc Sensor (DEXCOM G6 SENSOR) MISC Change every 10 days 3 each 5    Continuous Blood Gluc Transmit (DEXCOM G6 TRANSMITTER) MISC To check glucose change one every 3 months 1 each 3    Insulin Disposable Pump (OMNIPOD 5 G6 INTRO, GEN 5,) KIT Use to administer insulin 1 kit 0    Insulin Disposable Pump (OMNIPOD 5 G6 POD, GEN 5,) MISC Change every 3 days 30 each 5    insulin lispro (HUMALOG) 100 UNIT/ML SOLN injection vial INJECT UP  UNITS VIA INSULIN PUMP DAILY 30 mL 3    candesartan (ATACAND) 8 MG tablet Take 8 mg by mouth daily      blood glucose test strips (ONETOUCH VERIO) strip TEST FOUR TIMES A  strip 5    diclofenac 1 % CREA Place onto the skin as needed for Pain      IRON PO Take 65 mg by mouth      Blood Glucose Monitoring Suppl (ONETOUCH VERIO FLEX SYSTEM) w/Device KIT by Does not apply route      aspirin 81 MG chewable tablet Take 81 mg by mouth      vitamin D (CHOLECALCIFEROL) 1000 units TABS tablet Take 1,000 Units by mouth      cyanocobalamin 1000 MCG tablet Take 1,000 mcg by mouth      pramipexole (MIRAPEX) 0.25 MG tablet Take 0.5 mg by mouth Pt takes 0.5 mg daily      simvastatin (ZOCOR) 10 MG tablet        No current facility-administered medications for this visit. Allergies   Allergen Reactions    Allevess [Capsaicin-Menthol]      Pt denies 10/18/18    Lisinopril Other (See Comments)     Low BP     Morphine      Pt denies 18    Naproxen Sodium Hives    Nitroglycerin      Low blood pressure, pt denies 18     Family Status   Relation Name Status    Mother  Alive    Father      Sister  Alive    Brother  Alive    Sister  Alive    MGM  (Not Specified)     ROS  I have reviewed the review of system questionnaire filled by the patient .   Patient was advised to contact PCP for non endocrine signs and symptoms     OBJECTIVE:  BP (!) 166/63   Pulse 73   Resp 16   Ht 5' 5\" (1.651 m)   Wt 146 lb (66.2 kg)   BMI 24.30 kg/m²    Wt Readings from Last 3 Encounters:   23 146 lb (66.2 kg)   22 139 lb (63 kg)   22 142 lb (64.4 kg)     Constitutional: no acute distress, well appearing, well nourished  Psychiatric: oriented to person, place and time, judgement, insight and normal, recent and remote memory and intact and mood, affect are normal  Skin: skin and subcutaneous tissue is normal without mass,   Head and Face: examination of head and face revealed no abnormalities  Eyes: no lid or conjunctival swelling, no erythema or discharge, pupils are normal,   Ears/Nose: external inspection of ears and nose revealed no abnormalities, hearing is grossly normal  Oropharynx/Mouth/Face: lips, tongue and gums are normal with no lesions, the voice quality was normal  Neck: neck is supple and symmetric, with midline trachea and no masses, thyroid is normal    Pulmonary: no increased work of breathing or signs of respiratory distress, lungs are clear to auscultation  Cardiovascular: normal heart rate and rhythm, normal S1 and S2,   Musculoskeletal: normal gait and station,   Neurological: normal coordination, normal general cortical function          Lab Results   Component Value Date/Time    LABA1C 7.3 02/21/2023 09:53 AM    LABA1C 7.4 10/10/2022 08:17 AM    LABA1C 7.6 06/13/2022 08:43 AM         ASSESSMENT/PLAN:      Uncontrolled TYPE 1 DIABETES WITH COMPLICATIONS -- 1.4>>3.6 > 7.9>.8.5>>7.9>>7.5>>8.4>>8.5 >>8.5>>8.0>>7.5>>7.1>>8.3>>7.9>>8.4>>8.8>>8.4>>8.0>>8.5>>8.0>>7.6    A1c stable  A1c 7.4>>7.3  She has meal related hyperglycemia, some improvement in glycemia since omnipod 5 close loop system   I have reviewed her Dexcom and OmniPod data with her in detail  Most excursions are meal related, we discussed that she can take half a bolus before eating and have a bolus after finishing her meal to see if that better covers are meal related excursions. Switch to OmniPod 5  Stop metformin.     She was again advised to take her meal boluses 10 minutes before she eats she still struggles with that   She was advised to avoid snacks at bedtime especially if the blood sugars are already high     Met with diabetes educator oct 2018  And is familiar with carb counting     ----she will continue with metformin 500 mg bid   --victoza gave extreme nausea so stopped     Health maintenance  --eye exam no retinopathy no retinopathy jan 2022  --feet exam examianed ---discussed feet care    last microalbumin to creatinine ratio was normal in September 2021 with a level of 4.8     --- Thyroid normal on palpation No discreet thyroid nodules identified on exam     Abdominal aortic aneurysm   Stable imaging in 2014   Last imaging in 2017   She is seeing vascular Dr Ric Guillermo     -- stress test and visit with cards with no CAD     Low WBS   Follows with hematology     Hyperlipidemia   She is now 10 mg of simva ldl is at target   she has strong family hx of CAD despite good cholesterol. HYPERTENSION    well controlled on the current regimen   Continue with current regimen   Mitral valve prolapse     POSTMENOPAUSAL   Ob /gyn has been ordering dexa    she doesn't want to take medication for osteoporosis she does have family history of osteoporosis with her mom having osteoporosis. FIBROMYALGIA   Stable follows with Dr Jennifer Laurent   She had dexa scan done as per pt within last 1-2 years and is normal       Reviewed and/or ordered clinical lab results Yes  Reviewed and/or ordered radiology tests Yes  Reviewed and/or ordered other diagnostic tests Yes  Made a decision to obtain old records Yes  Reviewed and summarized old records Yes    Rianna Will was counseled regarding symptoms of current diagnosis, course and complications of disease if inadequately treated, side effects of medications, diagnosis, treatment options, and prognosis, risks, benefits, complications, and alternatives of treatment, labs, imaging and other studies and treatment targets and goals. She understands instructions and counseling. These diagnosis were discussed and reviewed with the patient including the advantages of drug therapy. She was counseled at this visit on the following: diabetes complication prevention and foot care. I spent  30 + minutes which includes reviewing patient chart , interpreting previous lab results  , discussing and providing counseling and coordinating care of patient's multiple health issues with  the patient. Return in about 3 months (around 5/27/2023).         Diabetes Continuous Glucose Monitoring Report         Reason for Study:     - improve diabetic control without risk of hypoglycemia       Current Medication regimen:   omnipod      CGMS Report     CGMS data collection was performed on feb, 27 , 2023   Patient provided information on her  diet, activities and insulin dosing  during this period. Data was available for 14   days     Sensor Data Report:   - 12 AM to 6 AM: Overnight blood glucose pattern shows elevated   - 6   AM to 10 AM:  Post breakfast hyperglycemia  is noted  --10AM to 5 PM : Occasional hypoglycemia observed during this time. - 5   PM to 8 PM: Post meal hyperglycemia  is noted       Average reading 168  mg/dL   Standard Dev 62 mg/dL   % of time <70 mg/dL 5 %   % of time >180  Mg/dL 21  %   % of time within range 67  %  Number of hypoglycemia episodes noted: 0     Impression:   CGMS shows correction related hypoglycemia postprandial hyperglycemia no     Recommendation:      Patient was advised to make the following changes in her diabetic regimen   OmniPod 5 insulin pump in  closed-loop system.   Advised to start taking meal boluses 10 min before eating

## 2023-03-11 DIAGNOSIS — E10.649 UNCONTROLLED TYPE 1 DIABETES MELLITUS WITH HYPOGLYCEMIA WITHOUT COMA (HCC): ICD-10-CM

## 2023-03-13 RX ORDER — INSULIN LISPRO 100 [IU]/ML
INJECTION, SOLUTION INTRAVENOUS; SUBCUTANEOUS
Qty: 30 ML | Refills: 3 | Status: SHIPPED | OUTPATIENT
Start: 2023-03-13

## 2023-05-03 ENCOUNTER — TELEPHONE (OUTPATIENT)
Dept: ENDOCRINOLOGY | Age: 68
End: 2023-05-03

## 2023-05-03 NOTE — TELEPHONE ENCOUNTER
Patient still has osteopenia as per T score but her FRAX calculation puts her at a slightly higher risk in the past patient has declined  to take any medication for osteoporosis we will readdress it at follow-up visit

## 2023-05-04 ENCOUNTER — TELEPHONE (OUTPATIENT)
Dept: ENDOCRINOLOGY | Age: 68
End: 2023-05-04

## 2023-05-04 DIAGNOSIS — E10.65 UNCONTROLLED TYPE 1 DIABETES MELLITUS WITH HYPERGLYCEMIA (HCC): ICD-10-CM

## 2023-05-04 RX ORDER — PROCHLORPERAZINE 25 MG/1
SUPPOSITORY RECTAL
Qty: 1 EACH | Refills: 3 | Status: SHIPPED | OUTPATIENT
Start: 2023-05-04

## 2023-05-04 NOTE — TELEPHONE ENCOUNTER
Fax from [address] [place] [address] refill request for [employer] 6 transmitter    LOV   2-27-23  FOV    8-28-23

## 2023-08-24 DIAGNOSIS — M79.7 FIBROMYALGIA: ICD-10-CM

## 2023-08-24 DIAGNOSIS — E10.65 UNCONTROLLED TYPE 1 DIABETES MELLITUS WITH HYPERGLYCEMIA (HCC): ICD-10-CM

## 2023-08-24 DIAGNOSIS — I10 PRIMARY HYPERTENSION: ICD-10-CM

## 2023-08-24 DIAGNOSIS — M15.9 PRIMARY OSTEOARTHRITIS INVOLVING MULTIPLE JOINTS: ICD-10-CM

## 2023-08-24 DIAGNOSIS — E78.2 MIXED HYPERLIPIDEMIA: ICD-10-CM

## 2023-08-24 DIAGNOSIS — E55.9 VITAMIN D DEFICIENCY: ICD-10-CM

## 2023-08-24 LAB
ALBUMIN SERPL-MCNC: 4.3 G/DL (ref 3.4–5)
ALBUMIN/GLOB SERPL: 2 {RATIO} (ref 1.1–2.2)
ALP SERPL-CCNC: 81 U/L (ref 40–129)
ALT SERPL-CCNC: 13 U/L (ref 10–40)
ANION GAP SERPL CALCULATED.3IONS-SCNC: 8 MMOL/L (ref 3–16)
AST SERPL-CCNC: 17 U/L (ref 15–37)
BILIRUB SERPL-MCNC: 0.6 MG/DL (ref 0–1)
BUN SERPL-MCNC: 18 MG/DL (ref 7–20)
CALCIUM SERPL-MCNC: 9.9 MG/DL (ref 8.3–10.6)
CHLORIDE SERPL-SCNC: 102 MMOL/L (ref 99–110)
CHOLEST SERPL-MCNC: 167 MG/DL (ref 0–199)
CO2 SERPL-SCNC: 28 MMOL/L (ref 21–32)
CREAT SERPL-MCNC: 0.7 MG/DL (ref 0.6–1.2)
CREAT UR-MCNC: 132 MG/DL (ref 28–259)
EST. AVERAGE GLUCOSE BLD GHB EST-MCNC: 165.7 MG/DL
GFR SERPLBLD CREATININE-BSD FMLA CKD-EPI: >60 ML/MIN/{1.73_M2}
GLUCOSE SERPL-MCNC: 132 MG/DL (ref 70–99)
HBA1C MFR BLD: 7.4 %
HDLC SERPL-MCNC: 97 MG/DL (ref 40–60)
LDLC SERPL CALC-MCNC: 56 MG/DL
MICROALBUMIN UR DL<=1MG/L-MCNC: <1.2 MG/DL
MICROALBUMIN/CREAT UR: NORMAL MG/G (ref 0–30)
POTASSIUM SERPL-SCNC: 4.3 MMOL/L (ref 3.5–5.1)
PROT SERPL-MCNC: 6.5 G/DL (ref 6.4–8.2)
SODIUM SERPL-SCNC: 138 MMOL/L (ref 136–145)
TRIGL SERPL-MCNC: 68 MG/DL (ref 0–150)
TSH SERPL DL<=0.005 MIU/L-ACNC: 3.17 UIU/ML (ref 0.27–4.2)
VLDLC SERPL CALC-MCNC: 14 MG/DL

## 2023-08-28 ENCOUNTER — PATIENT MESSAGE (OUTPATIENT)
Dept: ENDOCRINOLOGY | Age: 68
End: 2023-08-28

## 2023-08-28 ENCOUNTER — OFFICE VISIT (OUTPATIENT)
Dept: ENDOCRINOLOGY | Age: 68
End: 2023-08-28
Payer: MEDICARE

## 2023-08-28 VITALS
WEIGHT: 145 LBS | TEMPERATURE: 98 F | RESPIRATION RATE: 14 BRPM | BODY MASS INDEX: 24.16 KG/M2 | DIASTOLIC BLOOD PRESSURE: 85 MMHG | HEIGHT: 65 IN | SYSTOLIC BLOOD PRESSURE: 135 MMHG | HEART RATE: 68 BPM

## 2023-08-28 DIAGNOSIS — E10.65 UNCONTROLLED TYPE 1 DIABETES MELLITUS WITH HYPERGLYCEMIA (HCC): Primary | ICD-10-CM

## 2023-08-28 DIAGNOSIS — E10.649 UNCONTROLLED TYPE 1 DIABETES MELLITUS WITH HYPOGLYCEMIA WITHOUT COMA (HCC): ICD-10-CM

## 2023-08-28 DIAGNOSIS — E78.2 MIXED HYPERLIPIDEMIA: ICD-10-CM

## 2023-08-28 DIAGNOSIS — I10 PRIMARY HYPERTENSION: ICD-10-CM

## 2023-08-28 PROCEDURE — 1123F ACP DISCUSS/DSCN MKR DOCD: CPT | Performed by: INTERNAL MEDICINE

## 2023-08-28 PROCEDURE — 3079F DIAST BP 80-89 MM HG: CPT | Performed by: INTERNAL MEDICINE

## 2023-08-28 PROCEDURE — 3075F SYST BP GE 130 - 139MM HG: CPT | Performed by: INTERNAL MEDICINE

## 2023-08-28 PROCEDURE — 99214 OFFICE O/P EST MOD 30 MIN: CPT | Performed by: INTERNAL MEDICINE

## 2023-08-28 PROCEDURE — 3051F HG A1C>EQUAL 7.0%<8.0%: CPT | Performed by: INTERNAL MEDICINE

## 2023-08-28 PROCEDURE — 95251 CONT GLUC MNTR ANALYSIS I&R: CPT | Performed by: INTERNAL MEDICINE

## 2023-08-28 RX ORDER — INSULIN LISPRO 100 [IU]/ML
INJECTION, SOLUTION INTRAVENOUS; SUBCUTANEOUS
Qty: 30 ML | Refills: 3 | Status: SHIPPED | OUTPATIENT
Start: 2023-08-28

## 2023-08-28 RX ORDER — BLOOD SUGAR DIAGNOSTIC
STRIP MISCELLANEOUS
Qty: 200 STRIP | Refills: 5 | Status: SHIPPED | OUTPATIENT
Start: 2023-08-28

## 2023-08-28 RX ORDER — PROCHLORPERAZINE 25 MG/1
SUPPOSITORY RECTAL
Qty: 3 EACH | Refills: 5 | Status: SHIPPED | OUTPATIENT
Start: 2023-08-28

## 2023-08-28 RX ORDER — INSULIN PMP CART,AUT,G6/7,CNTR
EACH SUBCUTANEOUS
Qty: 30 EACH | Refills: 5 | Status: SHIPPED | OUTPATIENT
Start: 2023-08-28

## 2023-08-28 RX ORDER — PROCHLORPERAZINE 25 MG/1
SUPPOSITORY RECTAL
Qty: 1 EACH | Refills: 3 | Status: SHIPPED | OUTPATIENT
Start: 2023-08-28

## 2023-08-28 ASSESSMENT — ENCOUNTER SYMPTOMS: VISUAL CHANGE: 0

## 2023-08-28 NOTE — TELEPHONE ENCOUNTER
From: Davidson Landis  To: Dr. Matias Rosa  Sent: 8/28/2023 3:01 PM EDT  Subject: Meter    I use the One Touch Verio meter

## 2023-08-28 NOTE — PROGRESS NOTES
osteoporosis she does have family history of osteoporosis with her mom having osteoporosis. Discussed the results of DEXA scan showing osteopenia but FRAX calculation shows the need for therapy, gave patient a handout on Fosamax and advised that we should start taking the medication she was also advised to optimize calcium and vitamin D supplement. FIBROMYALGIA   Stable follows with Dr Xavier Tafoya   She had dexa scan done as per pt within last 1-2 years and is normal       Reviewed and/or ordered clinical lab results Yes  Reviewed and/or ordered radiology tests Yes  Reviewed and/or ordered other diagnostic tests Yes  Made a decision to obtain old records Yes  Reviewed and summarized old records Yes    Stoney Martinez was counseled regarding symptoms of current diagnosis, course and complications of disease if inadequately treated, side effects of medications, diagnosis, treatment options, and prognosis, risks, benefits, complications, and alternatives of treatment, labs, imaging and other studies and treatment targets and goals. She understands instructions and counseling. These diagnosis were discussed and reviewed with the patient including the advantages of drug therapy. She was counseled at this visit on the following: diabetes complication prevention and foot care. Return in about 3 months (around 11/28/2023). Diabetes Continuous Glucose Monitoring Report         Reason for Study:     - improve diabetic control without risk of hypoglycemia       Current Medication regimen:   omnipod and Dexcom      CGMS Report     CGMS data collection was performed on Aug 28  , 2023   Patient provided information on her  diet, activities and insulin dosing  during this period.    Data was available for 14   days     Sensor Data Report:   - 12 AM to 6 AM: Overnight blood glucose pattern shows elevated   - 6   AM to 10 AM:  Post breakfast hyperglycemia  is noted  --10AM to 5 PM : Occasional hypoglycemia observed

## 2023-08-30 ENCOUNTER — TELEPHONE (OUTPATIENT)
Dept: ENDOCRINOLOGY | Age: 68
End: 2023-08-30

## 2023-08-30 DIAGNOSIS — E10.649 UNCONTROLLED TYPE 1 DIABETES MELLITUS WITH HYPOGLYCEMIA WITHOUT COMA (HCC): ICD-10-CM

## 2023-08-30 RX ORDER — BLOOD-GLUCOSE METER
EACH MISCELLANEOUS
Qty: 1 KIT | Refills: 0 | Status: SHIPPED | OUTPATIENT
Start: 2023-08-30

## 2023-08-30 RX ORDER — BLOOD-GLUCOSE METER
EACH MISCELLANEOUS
Qty: 1 KIT | Refills: 0 | Status: SHIPPED | OUTPATIENT
Start: 2023-08-30 | End: 2023-08-30 | Stop reason: SDUPTHER

## 2023-08-30 NOTE — TELEPHONE ENCOUNTER
Rx doesn't state the frequency          Blood Glucose Monitoring Suppl (Mel[employer] FLEX SYSTEM) w/Device KIT           [facility] [id number] [id number]   Phone:  [phone]  Fax:  [phone]

## 2024-01-29 DIAGNOSIS — E10.649 UNCONTROLLED TYPE 1 DIABETES MELLITUS WITH HYPOGLYCEMIA WITHOUT COMA (HCC): ICD-10-CM

## 2024-01-29 DIAGNOSIS — I10 PRIMARY HYPERTENSION: ICD-10-CM

## 2024-01-29 DIAGNOSIS — E78.2 MIXED HYPERLIPIDEMIA: ICD-10-CM

## 2024-01-29 DIAGNOSIS — E10.65 UNCONTROLLED TYPE 1 DIABETES MELLITUS WITH HYPERGLYCEMIA (HCC): ICD-10-CM

## 2024-01-29 LAB
ALBUMIN SERPL-MCNC: 4.2 G/DL (ref 3.4–5)
ALBUMIN/GLOB SERPL: 1.8 {RATIO} (ref 1.1–2.2)
ALP SERPL-CCNC: 74 U/L (ref 40–129)
ALT SERPL-CCNC: 10 U/L (ref 10–40)
ANION GAP SERPL CALCULATED.3IONS-SCNC: 9 MMOL/L (ref 3–16)
AST SERPL-CCNC: 16 U/L (ref 15–37)
BILIRUB SERPL-MCNC: 0.6 MG/DL (ref 0–1)
BUN SERPL-MCNC: 19 MG/DL (ref 7–20)
CALCIUM SERPL-MCNC: 9.6 MG/DL (ref 8.3–10.6)
CHLORIDE SERPL-SCNC: 101 MMOL/L (ref 99–110)
CHOLEST SERPL-MCNC: 176 MG/DL (ref 0–199)
CO2 SERPL-SCNC: 27 MMOL/L (ref 21–32)
CREAT SERPL-MCNC: 0.7 MG/DL (ref 0.6–1.2)
CREAT UR-MCNC: 123.6 MG/DL (ref 28–259)
GFR SERPLBLD CREATININE-BSD FMLA CKD-EPI: >60 ML/MIN/{1.73_M2}
GLUCOSE SERPL-MCNC: 132 MG/DL (ref 70–99)
HDLC SERPL-MCNC: 92 MG/DL (ref 40–60)
LDLC SERPL CALC-MCNC: 68 MG/DL
MICROALBUMIN UR DL<=1MG/L-MCNC: <1.2 MG/DL
MICROALBUMIN/CREAT UR: NORMAL MG/G (ref 0–30)
POTASSIUM SERPL-SCNC: 4.7 MMOL/L (ref 3.5–5.1)
PROT SERPL-MCNC: 6.5 G/DL (ref 6.4–8.2)
SODIUM SERPL-SCNC: 137 MMOL/L (ref 136–145)
TRIGL SERPL-MCNC: 82 MG/DL (ref 0–150)
TSH SERPL DL<=0.005 MIU/L-ACNC: 2.79 UIU/ML (ref 0.27–4.2)
VLDLC SERPL CALC-MCNC: 16 MG/DL

## 2024-01-30 LAB
EST. AVERAGE GLUCOSE BLD GHB EST-MCNC: 154.2 MG/DL
HBA1C MFR BLD: 7 %

## 2024-02-06 ENCOUNTER — TELEPHONE (OUTPATIENT)
Dept: ENDOCRINOLOGY | Age: 69
End: 2024-02-06

## 2024-02-06 NOTE — TELEPHONE ENCOUNTER
Call from [facility] stating that the pt needs a PA for Omnipod 5 G6 gen 5    Ref# [id number]      Please advise   CB# [phone] Juana Khan

## 2024-03-04 ENCOUNTER — OFFICE VISIT (OUTPATIENT)
Dept: ENDOCRINOLOGY | Age: 69
End: 2024-03-04
Payer: MEDICARE

## 2024-03-04 VITALS
WEIGHT: 146 LBS | BODY MASS INDEX: 24.32 KG/M2 | HEART RATE: 79 BPM | RESPIRATION RATE: 14 BRPM | DIASTOLIC BLOOD PRESSURE: 84 MMHG | TEMPERATURE: 98 F | HEIGHT: 65 IN | SYSTOLIC BLOOD PRESSURE: 139 MMHG

## 2024-03-04 DIAGNOSIS — M85.80 OSTEOPENIA, UNSPECIFIED LOCATION: ICD-10-CM

## 2024-03-04 DIAGNOSIS — E55.9 VITAMIN D DEFICIENCY: Primary | ICD-10-CM

## 2024-03-04 DIAGNOSIS — E10.65 UNCONTROLLED TYPE 1 DIABETES MELLITUS WITH HYPERGLYCEMIA (HCC): ICD-10-CM

## 2024-03-04 DIAGNOSIS — E78.2 MIXED HYPERLIPIDEMIA: ICD-10-CM

## 2024-03-04 PROCEDURE — 95251 CONT GLUC MNTR ANALYSIS I&R: CPT | Performed by: INTERNAL MEDICINE

## 2024-03-04 PROCEDURE — 1123F ACP DISCUSS/DSCN MKR DOCD: CPT | Performed by: INTERNAL MEDICINE

## 2024-03-04 PROCEDURE — 3079F DIAST BP 80-89 MM HG: CPT | Performed by: INTERNAL MEDICINE

## 2024-03-04 PROCEDURE — 3051F HG A1C>EQUAL 7.0%<8.0%: CPT | Performed by: INTERNAL MEDICINE

## 2024-03-04 PROCEDURE — 99214 OFFICE O/P EST MOD 30 MIN: CPT | Performed by: INTERNAL MEDICINE

## 2024-03-04 PROCEDURE — 3075F SYST BP GE 130 - 139MM HG: CPT | Performed by: INTERNAL MEDICINE

## 2024-03-04 RX ORDER — METHYLPREDNISOLONE 4 MG/1
TABLET ORAL
COMMUNITY
Start: 2024-02-28 | End: 2024-03-09

## 2024-03-04 RX ORDER — INSULIN PMP CART,AUT,G6/7,CNTR
EACH SUBCUTANEOUS
Qty: 30 EACH | Refills: 5 | Status: SHIPPED | OUTPATIENT
Start: 2024-03-04

## 2024-03-04 RX ORDER — CEFDINIR 300 MG/1
300 CAPSULE ORAL 2 TIMES DAILY
COMMUNITY
Start: 2024-02-28 | End: 2024-03-09

## 2024-03-04 ASSESSMENT — ENCOUNTER SYMPTOMS: VISUAL CHANGE: 0

## 2024-03-04 NOTE — PROGRESS NOTES
masses, thyroid is normal    Pulmonary: no increased work of breathing or signs of respiratory distress, lungs are clear to auscultation  Cardiovascular: normal heart rate and rhythm, normal S1 and S2,   Musculoskeletal: normal gait and station,   Neurological: normal coordination, normal general cortical function      Lab Results   Component Value Date/Time    LABA1C 7.0 01/29/2024 08:55 AM    LABA1C 7.4 08/24/2023 08:50 AM    LABA1C 7.3 02/21/2023 09:53 AM         ASSESSMENT/PLAN:      Uncontrolled TYPE 1 DIABETES WITH COMPLICATIONS --     Aic 7.4 in aug 2023 >>7.0   Diabetes control has improved  I reviewed patient's  continuous glucose sensor data in detail and made appropriate changes in patient's diabetic regimen.   Most excursions are meal related, we discussed that she can take half a bolus before eating and have a bolus after finishing her meal to see if that better covers are meal related excursions.  Currently on steroids for URI which is causing hyperglycemia     Met with diabetes educator oct 2018  And is familiar with carb counting     -victoza gave extreme nausea so stopped ---she inquired about Ozempic, since she is not in the obese category would not be a candidate    Health maintenance  --eye exam no retinopathy no retinopathy feb 2023   --feet exam examianed ---discussed feet care    last microalbumin to creatinine ratio was normal in September 2021 with a level of 4.8     --- Thyroid normal on palpation No discreet thyroid nodules identified on exam     Abdominal aortic aneurysm   Stable imaging in 2014   Last imaging in 2017   She is seeing vascular Dr Young   Gets pain in her legs after walking she is undergoing vascular studies   -- stress test and visit with cards with no CAD     Low WBS   Follows with hematology     Hyperlipidemia   She is now 10 mg of simva ldl is at target   she has strong family hx of CAD despite good cholesterol.    HYPERTENSION    well controlled on the current regimen

## 2024-03-28 ENCOUNTER — TELEPHONE (OUTPATIENT)
Dept: ENDOCRINOLOGY | Age: 69
End: 2024-03-28

## 2024-03-28 NOTE — TELEPHONE ENCOUNTER
Left VM for patient that her letter is ready from Dr. Walker regarding her claim. We need to know if she is planning on picking up the letter or if she would like us to fax it somewhere.

## 2024-03-28 NOTE — TELEPHONE ENCOUNTER
Pt called back, gave pt verbatim message.     Pt stated that she would like the letter faxed to [phone]

## 2024-04-18 DIAGNOSIS — E10.65 UNCONTROLLED TYPE 1 DIABETES MELLITUS WITH HYPERGLYCEMIA (HCC): ICD-10-CM

## 2024-04-18 RX ORDER — PROCHLORPERAZINE 25 MG/1
SUPPOSITORY RECTAL
Qty: 3 EACH | Refills: 5 | Status: SHIPPED | OUTPATIENT
Start: 2024-04-18

## 2024-04-28 DIAGNOSIS — E10.649 UNCONTROLLED TYPE 1 DIABETES MELLITUS WITH HYPOGLYCEMIA WITHOUT COMA (HCC): ICD-10-CM

## 2024-04-29 RX ORDER — INSULIN LISPRO 100 [IU]/ML
INJECTION, SOLUTION INTRAVENOUS; SUBCUTANEOUS
Qty: 30 ML | Refills: 3 | Status: SHIPPED | OUTPATIENT
Start: 2024-04-29

## 2024-04-29 NOTE — TELEPHONE ENCOUNTER
Requested Prescriptions     Pending Prescriptions Disp Refills    insulin lispro (HUMALOG) 100 UNIT/ML SOLN injection vial [Pharmacy Med Name: INSULIN LISPRO 100 UNIT/ML VL] 30 mL 3     Sig: INJECT UP  UNITS VIA INSULIN PUMP DAILY           [facility] [id number] - [place], [place] - [address] [place] [phone] - F [phone]  [address] [place] [address]  Phone: [phone] Fax: [phone]

## 2024-05-06 ENCOUNTER — TELEPHONE (OUTPATIENT)
Dept: ENDOCRINOLOGY | Age: 69
End: 2024-05-06

## 2024-05-06 DIAGNOSIS — E10.65 UNCONTROLLED TYPE 1 DIABETES MELLITUS WITH HYPERGLYCEMIA (HCC): ICD-10-CM

## 2024-05-06 RX ORDER — PROCHLORPERAZINE 25 MG/1
SUPPOSITORY RECTAL
Qty: 1 EACH | Refills: 3 | Status: SHIPPED | OUTPATIENT
Start: 2024-05-06

## 2024-05-06 NOTE — TELEPHONE ENCOUNTER
Fax from [facility]    Refill request for Dexcom G 6 transmitter    LOV   3-4-24  FOV   8-12-24

## 2024-06-28 ENCOUNTER — TELEPHONE (OUTPATIENT)
Dept: ENDOCRINOLOGY | Age: 69
End: 2024-06-28

## 2024-06-28 DIAGNOSIS — E10.65 UNCONTROLLED TYPE 1 DIABETES MELLITUS WITH HYPERGLYCEMIA (HCC): ICD-10-CM

## 2024-06-28 NOTE — TELEPHONE ENCOUNTER
Pt calling and states she is going through her Omnipods quickly. Pt states because the weather has been so hot and muggy the pod is just falling off and not lasting the 3 days. Pt states she will need refill but going to be to soon to fill    Pharmacy [facility] - [facility]# [phone]

## 2024-07-01 RX ORDER — INSULIN PMP CART,AUT,G6/7,CNTR
EACH SUBCUTANEOUS
Qty: 45 EACH | Refills: 3 | Status: SHIPPED | OUTPATIENT
Start: 2024-07-01

## 2024-07-09 ENCOUNTER — TELEPHONE (OUTPATIENT)
Dept: ENDOCRINOLOGY | Age: 69
End: 2024-07-09

## 2024-07-09 NOTE — TELEPHONE ENCOUNTER
Pt stating she is having an eco cardiogram and nuclear stress test and asking if it will effect her pump and sensor

## 2024-07-10 NOTE — TELEPHONE ENCOUNTER
Good question.  OmniPod needs to be removed when doing EKG or nuclear stress test as electromagnetic field can damage to the pod/PDM controller.  It would be also better to remove Dexcom as well during the nuclear stress test.    It is probably okay to keep Dexcom while doing EKG.

## 2024-08-05 DIAGNOSIS — M85.80 OSTEOPENIA, UNSPECIFIED LOCATION: ICD-10-CM

## 2024-08-05 DIAGNOSIS — E10.65 UNCONTROLLED TYPE 1 DIABETES MELLITUS WITH HYPERGLYCEMIA (HCC): ICD-10-CM

## 2024-08-05 DIAGNOSIS — E78.2 MIXED HYPERLIPIDEMIA: ICD-10-CM

## 2024-08-05 DIAGNOSIS — E55.9 VITAMIN D DEFICIENCY: ICD-10-CM

## 2024-08-05 LAB
25(OH)D3 SERPL-MCNC: 52.6 NG/ML
ALBUMIN SERPL-MCNC: 4.3 G/DL (ref 3.4–5)
ALBUMIN/GLOB SERPL: 1.9 {RATIO} (ref 1.1–2.2)
ALP SERPL-CCNC: 85 U/L (ref 40–129)
ALT SERPL-CCNC: 25 U/L (ref 10–40)
ANION GAP SERPL CALCULATED.3IONS-SCNC: 9 MMOL/L (ref 3–16)
AST SERPL-CCNC: 22 U/L (ref 15–37)
BASOPHILS # BLD: 0 K/UL (ref 0–0.2)
BASOPHILS NFR BLD: 0.3 %
BILIRUB SERPL-MCNC: 0.5 MG/DL (ref 0–1)
BUN SERPL-MCNC: 20 MG/DL (ref 7–20)
CALCIUM SERPL-MCNC: 9.7 MG/DL (ref 8.3–10.6)
CHLORIDE SERPL-SCNC: 100 MMOL/L (ref 99–110)
CHOLEST SERPL-MCNC: 141 MG/DL (ref 0–199)
CO2 SERPL-SCNC: 28 MMOL/L (ref 21–32)
CREAT SERPL-MCNC: 0.7 MG/DL (ref 0.6–1.2)
CREAT UR-MCNC: 181.3 MG/DL (ref 28–259)
DEPRECATED RDW RBC AUTO: 13.5 % (ref 12.4–15.4)
EOSINOPHIL # BLD: 0 K/UL (ref 0–0.6)
EOSINOPHIL NFR BLD: 0.1 %
EST. AVERAGE GLUCOSE BLD GHB EST-MCNC: 174.3 MG/DL
GFR SERPLBLD CREATININE-BSD FMLA CKD-EPI: >90 ML/MIN/{1.73_M2}
GLUCOSE SERPL-MCNC: 169 MG/DL (ref 70–99)
HBA1C MFR BLD: 7.7 %
HCT VFR BLD AUTO: 33.7 % (ref 36–48)
HDLC SERPL-MCNC: 92 MG/DL (ref 40–60)
HGB BLD-MCNC: 11.4 G/DL (ref 12–16)
LDLC SERPL CALC-MCNC: 36 MG/DL
LYMPHOCYTES # BLD: 0.8 K/UL (ref 1–5.1)
LYMPHOCYTES NFR BLD: 15 %
MCH RBC QN AUTO: 29.7 PG (ref 26–34)
MCHC RBC AUTO-ENTMCNC: 33.8 G/DL (ref 31–36)
MCV RBC AUTO: 88 FL (ref 80–100)
MICROALBUMIN UR DL<=1MG/L-MCNC: 3.2 MG/DL
MICROALBUMIN/CREAT UR: 17.7 MG/G (ref 0–30)
MONOCYTES # BLD: 0.5 K/UL (ref 0–1.3)
MONOCYTES NFR BLD: 10.4 %
NEUTROPHILS # BLD: 3.9 K/UL (ref 1.7–7.7)
NEUTROPHILS NFR BLD: 74.2 %
PLATELET # BLD AUTO: 192 K/UL (ref 135–450)
PMV BLD AUTO: 8.7 FL (ref 5–10.5)
POTASSIUM SERPL-SCNC: 4.5 MMOL/L (ref 3.5–5.1)
PROT SERPL-MCNC: 6.6 G/DL (ref 6.4–8.2)
RBC # BLD AUTO: 3.83 M/UL (ref 4–5.2)
SODIUM SERPL-SCNC: 137 MMOL/L (ref 136–145)
TRIGL SERPL-MCNC: 66 MG/DL (ref 0–150)
TSH SERPL DL<=0.005 MIU/L-ACNC: 0.84 UIU/ML (ref 0.27–4.2)
VLDLC SERPL CALC-MCNC: 13 MG/DL
WBC # BLD AUTO: 5.2 K/UL (ref 4–11)

## 2024-08-12 ENCOUNTER — OFFICE VISIT (OUTPATIENT)
Dept: ENDOCRINOLOGY | Age: 69
End: 2024-08-12

## 2024-08-12 VITALS
HEIGHT: 65 IN | DIASTOLIC BLOOD PRESSURE: 59 MMHG | HEART RATE: 75 BPM | SYSTOLIC BLOOD PRESSURE: 140 MMHG | BODY MASS INDEX: 23.82 KG/M2 | RESPIRATION RATE: 16 BRPM | WEIGHT: 143 LBS

## 2024-08-12 DIAGNOSIS — E10.65 UNCONTROLLED TYPE 1 DIABETES MELLITUS WITH HYPERGLYCEMIA (HCC): ICD-10-CM

## 2024-08-12 DIAGNOSIS — E10.649 UNCONTROLLED TYPE 1 DIABETES MELLITUS WITH HYPOGLYCEMIA WITHOUT COMA (HCC): ICD-10-CM

## 2024-08-12 RX ORDER — ACYCLOVIR 400 MG/1
TABLET ORAL
Qty: 3 EACH | Refills: 4 | Status: SHIPPED | OUTPATIENT
Start: 2024-08-12

## 2024-08-12 RX ORDER — INSULIN LISPRO 100 [IU]/ML
INJECTION, SOLUTION INTRAVENOUS; SUBCUTANEOUS
Qty: 30 ML | Refills: 3 | Status: SHIPPED | OUTPATIENT
Start: 2024-08-12

## 2024-08-12 RX ORDER — INSULIN PMP CART,AUT,G6/7,CNTR
EACH SUBCUTANEOUS
Qty: 10 EACH | Refills: 5 | Status: SHIPPED | OUTPATIENT
Start: 2024-08-12

## 2024-08-12 RX ORDER — PROCHLORPERAZINE 25 MG/1
SUPPOSITORY RECTAL
Qty: 3 EACH | Refills: 5 | Status: CANCELLED | OUTPATIENT
Start: 2024-08-12

## 2024-08-20 ENCOUNTER — TELEPHONE (OUTPATIENT)
Dept: ENDOCRINOLOGY | Age: 69
End: 2024-08-20

## 2024-08-20 NOTE — PROGRESS NOTES
Left VM for patient to call office back.   
%   % of time within range 48   %  Number of hypoglycemia episodes noted: 0     Impression:   CGMS shows correction related hypoglycemia postprandial hyperglycemia no     Recommendation:      Patient was advised to make the following changes in her diabetic regimen   OmniPod 5 insulin pump in  closed-loop system.  Has been on steroids which has messed up control in the last few weeks.

## 2024-08-20 NOTE — TELEPHONE ENCOUNTER
----- Message from Dr. Mary Ann Walker MD sent at 8/12/2024 12:23 PM EDT -----  Please schedule for 4 months follow up and patient needs G 7 sensors and compatible omnipod

## 2024-08-20 NOTE — TELEPHONE ENCOUNTER
Left VM for patient to call office back to schedule a follow up.     Also, advised that there are no specific G7 sensors that are compatible with the Omnipod.     She would need the Omnipod pods that are compatible with the G7 sensors. The only pharmacy that we aware currently has the pods is the [facility]. Not sure if her insurance works with that pharmacy or not. Patient can call office back and we can discuss further.

## 2024-09-03 DIAGNOSIS — E10.649 UNCONTROLLED TYPE 1 DIABETES MELLITUS WITH HYPOGLYCEMIA WITHOUT COMA (HCC): Primary | ICD-10-CM

## 2024-09-03 RX ORDER — INSULIN PMP CART,AUT,G6/7,CNTR
EACH SUBCUTANEOUS
Qty: 10 EACH | Refills: 5 | Status: SHIPPED | OUTPATIENT
Start: 2024-09-03

## 2024-09-05 ENCOUNTER — TELEPHONE (OUTPATIENT)
Dept: ENDOCRINOLOGY | Age: 69
End: 2024-09-05

## 2024-11-11 DIAGNOSIS — E10.649 UNCONTROLLED TYPE 1 DIABETES MELLITUS WITH HYPOGLYCEMIA WITHOUT COMA (HCC): ICD-10-CM

## 2024-11-11 DIAGNOSIS — E10.65 UNCONTROLLED TYPE 1 DIABETES MELLITUS WITH HYPERGLYCEMIA (HCC): ICD-10-CM

## 2024-11-11 LAB
ALBUMIN SERPL-MCNC: 4.1 G/DL (ref 3.4–5)
ALBUMIN/GLOB SERPL: 1.9 {RATIO} (ref 1.1–2.2)
ALP SERPL-CCNC: 83 U/L (ref 40–129)
ALT SERPL-CCNC: 17 U/L (ref 10–40)
ANION GAP SERPL CALCULATED.3IONS-SCNC: 7 MMOL/L (ref 3–16)
AST SERPL-CCNC: 19 U/L (ref 15–37)
BILIRUB SERPL-MCNC: 0.4 MG/DL (ref 0–1)
BUN SERPL-MCNC: 23 MG/DL (ref 7–20)
CALCIUM SERPL-MCNC: 9.8 MG/DL (ref 8.3–10.6)
CHLORIDE SERPL-SCNC: 99 MMOL/L (ref 99–110)
CHOLEST SERPL-MCNC: 130 MG/DL (ref 0–199)
CO2 SERPL-SCNC: 25 MMOL/L (ref 21–32)
CREAT SERPL-MCNC: 0.7 MG/DL (ref 0.6–1.2)
CREAT UR-MCNC: 131 MG/DL (ref 28–259)
EST. AVERAGE GLUCOSE BLD GHB EST-MCNC: 174.3 MG/DL
GFR SERPLBLD CREATININE-BSD FMLA CKD-EPI: >90 ML/MIN/{1.73_M2}
GLUCOSE SERPL-MCNC: 228 MG/DL (ref 70–99)
HBA1C MFR BLD: 7.7 %
HDLC SERPL-MCNC: 74 MG/DL (ref 40–60)
LDLC SERPL CALC-MCNC: 41 MG/DL
MICROALBUMIN UR DL<=1MG/L-MCNC: 1.24 MG/DL
MICROALBUMIN/CREAT UR: 9.5 MG/G (ref 0–30)
POTASSIUM SERPL-SCNC: 4.5 MMOL/L (ref 3.5–5.1)
PROT SERPL-MCNC: 6.3 G/DL (ref 6.4–8.2)
SODIUM SERPL-SCNC: 131 MMOL/L (ref 136–145)
TRIGL SERPL-MCNC: 75 MG/DL (ref 0–150)
TSH SERPL DL<=0.005 MIU/L-ACNC: 1.01 UIU/ML (ref 0.27–4.2)
VLDLC SERPL CALC-MCNC: 15 MG/DL

## 2024-11-18 ENCOUNTER — OFFICE VISIT (OUTPATIENT)
Dept: ENDOCRINOLOGY | Age: 69
End: 2024-11-18
Payer: MEDICARE

## 2024-11-18 VITALS
WEIGHT: 143 LBS | TEMPERATURE: 98 F | HEIGHT: 65 IN | HEART RATE: 82 BPM | SYSTOLIC BLOOD PRESSURE: 139 MMHG | BODY MASS INDEX: 23.82 KG/M2 | DIASTOLIC BLOOD PRESSURE: 83 MMHG | RESPIRATION RATE: 14 BRPM

## 2024-11-18 DIAGNOSIS — E78.2 MIXED HYPERLIPIDEMIA: ICD-10-CM

## 2024-11-18 DIAGNOSIS — G25.81 RESTLESS LEG SYNDROME: ICD-10-CM

## 2024-11-18 DIAGNOSIS — I10 PRIMARY HYPERTENSION: ICD-10-CM

## 2024-11-18 DIAGNOSIS — E10.9 TYPE 1 DIABETES MELLITUS WITHOUT COMPLICATION (HCC): Primary | ICD-10-CM

## 2024-11-18 PROCEDURE — 1159F MED LIST DOCD IN RCRD: CPT | Performed by: INTERNAL MEDICINE

## 2024-11-18 PROCEDURE — 99214 OFFICE O/P EST MOD 30 MIN: CPT | Performed by: INTERNAL MEDICINE

## 2024-11-18 PROCEDURE — 3075F SYST BP GE 130 - 139MM HG: CPT | Performed by: INTERNAL MEDICINE

## 2024-11-18 PROCEDURE — 1123F ACP DISCUSS/DSCN MKR DOCD: CPT | Performed by: INTERNAL MEDICINE

## 2024-11-18 PROCEDURE — 3079F DIAST BP 80-89 MM HG: CPT | Performed by: INTERNAL MEDICINE

## 2024-11-18 PROCEDURE — 1160F RVW MEDS BY RX/DR IN RCRD: CPT | Performed by: INTERNAL MEDICINE

## 2024-11-18 PROCEDURE — 95251 CONT GLUC MNTR ANALYSIS I&R: CPT | Performed by: INTERNAL MEDICINE

## 2024-11-18 PROCEDURE — 3051F HG A1C>EQUAL 7.0%<8.0%: CPT | Performed by: INTERNAL MEDICINE

## 2024-11-18 RX ORDER — ROSUVASTATIN CALCIUM 20 MG/1
20 TABLET, COATED ORAL DAILY
COMMUNITY
Start: 2024-09-10

## 2024-11-18 RX ORDER — DOXYCYCLINE 100 MG/1
CAPSULE ORAL
COMMUNITY
Start: 2024-09-05 | End: 2024-11-28

## 2024-11-18 RX ORDER — AMLODIPINE BESYLATE 5 MG/1
5 TABLET ORAL DAILY
COMMUNITY
Start: 2024-09-10

## 2024-11-18 ASSESSMENT — ENCOUNTER SYMPTOMS: VISUAL CHANGE: 0

## 2024-11-18 NOTE — PROGRESS NOTES
% of time >180  Mg/dL 53  %   % of time within range 47   %  Number of hypoglycemia episodes noted: 0     Impression:   CGMS shows correction related hypoglycemia postprandial hyperglycemia no     Recommendation:      Patient was advised to make the following changes in her diabetic regimen   OmniPod 5 insulin pump in  closed-loop system.  Has been on steroids which has messed up control in the last few weeks.

## 2025-01-07 DIAGNOSIS — E10.649 UNCONTROLLED TYPE 1 DIABETES MELLITUS WITH HYPOGLYCEMIA WITHOUT COMA (HCC): ICD-10-CM

## 2025-01-08 RX ORDER — BLOOD SUGAR DIAGNOSTIC
STRIP MISCELLANEOUS
Qty: 100 STRIP | Refills: 1 | Status: SHIPPED | OUTPATIENT
Start: 2025-01-08

## 2025-01-10 ENCOUNTER — TELEPHONE (OUTPATIENT)
Dept: ENDOCRINOLOGY | Age: 70
End: 2025-01-10

## 2025-01-10 NOTE — TELEPHONE ENCOUNTER
Submitted PA for Omnipod POds  Via CMM Key: [id number]   STATUS:Available without authorization. The member is able to fill the requested drug at the pharmacy.     If this requires a response please respond to the pool ( P [facility] MEDICATION PRE-AUTH).      Thank you please advise patient.

## 2025-02-24 RX ORDER — ACYCLOVIR 400 MG/1
TABLET ORAL
Qty: 3 EACH | Refills: 1 | Status: SHIPPED | OUTPATIENT
Start: 2025-02-24

## 2025-03-06 ENCOUNTER — TELEPHONE (OUTPATIENT)
Dept: ENDOCRINOLOGY | Age: 70
End: 2025-03-06

## 2025-03-18 ENCOUNTER — TELEPHONE (OUTPATIENT)
Dept: ENDOCRINOLOGY | Age: 70
End: 2025-03-18

## 2025-03-18 NOTE — TELEPHONE ENCOUNTER
Pt calling and states she tried to get labs today but her labs are dated for May. She has appt Monday 3/25  Please update lab orders. Pt will go this Thurs to get labs    We do not have to call pt back to let her know

## 2025-03-20 DIAGNOSIS — E78.2 MIXED HYPERLIPIDEMIA: ICD-10-CM

## 2025-03-20 DIAGNOSIS — E10.9 TYPE 1 DIABETES MELLITUS WITHOUT COMPLICATION: ICD-10-CM

## 2025-03-20 DIAGNOSIS — I10 PRIMARY HYPERTENSION: ICD-10-CM

## 2025-03-20 LAB
ALBUMIN SERPL-MCNC: 4.3 G/DL (ref 3.4–5)
ALBUMIN/GLOB SERPL: 2 {RATIO} (ref 1.1–2.2)
ALP SERPL-CCNC: 77 U/L (ref 40–129)
ALT SERPL-CCNC: 18 U/L (ref 10–40)
ANION GAP SERPL CALCULATED.3IONS-SCNC: 10 MMOL/L (ref 3–16)
AST SERPL-CCNC: 22 U/L (ref 15–37)
BILIRUB SERPL-MCNC: 0.4 MG/DL (ref 0–1)
BUN SERPL-MCNC: 27 MG/DL (ref 7–20)
CALCIUM SERPL-MCNC: 9.4 MG/DL (ref 8.3–10.6)
CHLORIDE SERPL-SCNC: 104 MMOL/L (ref 99–110)
CHOLEST SERPL-MCNC: 145 MG/DL (ref 0–199)
CO2 SERPL-SCNC: 25 MMOL/L (ref 21–32)
CREAT SERPL-MCNC: 0.8 MG/DL (ref 0.6–1.2)
CREAT UR-MCNC: 149 MG/DL (ref 28–259)
GFR SERPLBLD CREATININE-BSD FMLA CKD-EPI: 78 ML/MIN/{1.73_M2}
GLUCOSE SERPL-MCNC: 164 MG/DL (ref 70–99)
HDLC SERPL-MCNC: 95 MG/DL (ref 40–60)
LDLC SERPL CALC-MCNC: 38 MG/DL
MICROALBUMIN UR DL<=1MG/L-MCNC: 1.35 MG/DL
MICROALBUMIN/CREAT UR: 9.1 MG/G (ref 0–30)
POTASSIUM SERPL-SCNC: 4.2 MMOL/L (ref 3.5–5.1)
PROT SERPL-MCNC: 6.4 G/DL (ref 6.4–8.2)
SODIUM SERPL-SCNC: 139 MMOL/L (ref 136–145)
TRIGL SERPL-MCNC: 58 MG/DL (ref 0–150)
TSH SERPL DL<=0.005 MIU/L-ACNC: 2.27 UIU/ML (ref 0.27–4.2)
VLDLC SERPL CALC-MCNC: 12 MG/DL

## 2025-03-21 LAB
EST. AVERAGE GLUCOSE BLD GHB EST-MCNC: 151.3 MG/DL
HBA1C MFR BLD: 6.9 %

## 2025-03-24 ENCOUNTER — OFFICE VISIT (OUTPATIENT)
Dept: ENDOCRINOLOGY | Age: 70
End: 2025-03-24
Payer: MEDICARE

## 2025-03-24 VITALS
RESPIRATION RATE: 14 BRPM | BODY MASS INDEX: 24.49 KG/M2 | TEMPERATURE: 98 F | HEIGHT: 65 IN | SYSTOLIC BLOOD PRESSURE: 126 MMHG | DIASTOLIC BLOOD PRESSURE: 79 MMHG | WEIGHT: 147 LBS | HEART RATE: 71 BPM

## 2025-03-24 DIAGNOSIS — M79.7 FIBROMYALGIA: ICD-10-CM

## 2025-03-24 DIAGNOSIS — M19.90 ARTHRITIS: ICD-10-CM

## 2025-03-24 DIAGNOSIS — E10.649 UNCONTROLLED TYPE 1 DIABETES MELLITUS WITH HYPOGLYCEMIA WITHOUT COMA (HCC): ICD-10-CM

## 2025-03-24 DIAGNOSIS — I10 PRIMARY HYPERTENSION: ICD-10-CM

## 2025-03-24 DIAGNOSIS — E10.9 TYPE 1 DIABETES MELLITUS WITHOUT COMPLICATION: Primary | ICD-10-CM

## 2025-03-24 PROCEDURE — 1123F ACP DISCUSS/DSCN MKR DOCD: CPT | Performed by: INTERNAL MEDICINE

## 2025-03-24 PROCEDURE — 1160F RVW MEDS BY RX/DR IN RCRD: CPT | Performed by: INTERNAL MEDICINE

## 2025-03-24 PROCEDURE — 3078F DIAST BP <80 MM HG: CPT | Performed by: INTERNAL MEDICINE

## 2025-03-24 PROCEDURE — 3074F SYST BP LT 130 MM HG: CPT | Performed by: INTERNAL MEDICINE

## 2025-03-24 PROCEDURE — 1159F MED LIST DOCD IN RCRD: CPT | Performed by: INTERNAL MEDICINE

## 2025-03-24 PROCEDURE — 3044F HG A1C LEVEL LT 7.0%: CPT | Performed by: INTERNAL MEDICINE

## 2025-03-24 PROCEDURE — 99214 OFFICE O/P EST MOD 30 MIN: CPT | Performed by: INTERNAL MEDICINE

## 2025-03-24 PROCEDURE — 95251 CONT GLUC MNTR ANALYSIS I&R: CPT | Performed by: INTERNAL MEDICINE

## 2025-03-24 NOTE — PROGRESS NOTES
Caroline Munson is a 70 y.o. female  seen for follow up of  Uncontrolled  Type 1 Diabetes . Pt was diagnosed with T1Dm in year 1996 she was initaily started on metformin but after a couple of months she was switched to insulin and was on insulin pump for years and then switched to basal bolus regimen as she didn't want a pump with tubing   She got diabetic education in the past .She feels she has been coumnting her carbs accurately  . She has hypoglycemia awareness and her threshold is in the 60's   She has Fibromyalgia and follows with dr Correa although she feels like she does not have fibromyalgia  She has AAA and follows with vascular surgery.  Vascular surgeon is also investigating issues with peripheral and probably would get stents placed.    INTERIM:        Patient denies any unexplained hypoglycemia.  Heme-onc visit went well      Past Medical History:   Diagnosis Date    Aortic dissection (HCC)     Arthritis     Chicken pox     Family history of cancer     Family history of coronary artery disease     Fibromyalgia     H/O cardiovascular stress test     HSV-1 infection     Hyperlipidemia     Hypertension     Infertility, female     Iron deficiency     Osteopenia     Postmenopausal bleeding     Rheumatoid arteritis (HCC)     Type 1 diabetes (HCC)     Type 1 diabetes mellitus (HCC)     Vitamin B 12 deficiency     Vitamin D deficiency       Patient Active Problem List   Diagnosis    Fibromyalgia    Arthritis    Type 1 diabetes mellitus without complication (HCC)    Vitamin D deficiency    Hyperlipidemia    Vitamin B 12 deficiency    Aortic dissection, abdominal (HCC)    Chronic midline low back pain without sciatica    Cramp of limb    Hypertension    Lung nodule    Myofascial pain    Myopathy    Osteoarthrosis    Osteopenia    Pain in limb    Restless leg syndrome    Right foot injury    Toe fracture, right    Uncontrolled type 1 diabetes mellitus     Past Surgical History:   Procedure Laterality Date

## 2025-03-24 NOTE — PATIENT INSTRUCTIONS
Patient Education        Hypoglycemia: Care Instructions  Overview  Hypoglycemia means that your blood sugar is low and your body isn’t getting enough fuel. Low blood sugar can be caused by too much insulin or certain medicines. Some people get low blood sugar from missing a meal or exercising too hard without eating enough food.    Know your signs of low blood sugar. They're different for everyone. Common early signs include nausea; hunger; and feeling nervous, irritable, or shaky.   It can help to check your blood sugar levels often. Take your insulin or other medicine as prescribed.   How can you care for yourself?   Use the \"rule of 15\" to treat low blood sugar.  Eat 15 grams of carbohydrate from a quick-sugar food (such as 3 or 4 glucose tablets or 1/2 cup of juice). Wait 15 minutes and check your blood sugar. Repeat if your blood sugar is still below 70 mg/dL.    Eat after your blood sugar is in a safe range.  A snack or meal can reduce symptoms and prevent low blood sugar from coming back.    Tell friends, family, and coworkers how they can help you.  Make sure that they know the symptoms of low blood sugar and how to help you get your sugar levels up.    If you have glucagon, keep it with you.  Make sure that your friends, family, and coworkers know how to use it.   When should you call for help?    Call 911 anytime you think you may need emergency care. For example, call if:  You passed out (lost consciousness).  You are confused or cannot think clearly.  Your blood sugar is very high or very low.  Watch closely for changes in your health, and be sure to contact your doctor if:  Your blood sugar stays outside the level your doctor set for you.  You have any problems.  Where can you learn more?  Go to https://[email or web address].net/patientEd and enter R955 to learn more about \"Hypoglycemia: Care Instructions.\"  Current as of: April 30, 2024  Content Version: 14.4  © 9871-1665 [email or web address] [employer] ([employer]   Care

## 2025-03-25 RX ORDER — INSULIN PMP CART,AUT,G6/7,CNTR
EACH SUBCUTANEOUS
Qty: 10 EACH | Refills: 5 | Status: SHIPPED | OUTPATIENT
Start: 2025-03-25

## 2025-04-24 RX ORDER — ACYCLOVIR 400 MG/1
TABLET ORAL
Qty: 3 EACH | Refills: 3 | Status: SHIPPED | OUTPATIENT
Start: 2025-04-24

## 2025-05-09 DIAGNOSIS — E10.649 UNCONTROLLED TYPE 1 DIABETES MELLITUS WITH HYPOGLYCEMIA WITHOUT COMA (HCC): ICD-10-CM

## 2025-05-09 RX ORDER — INSULIN LISPRO 100 [IU]/ML
INJECTION, SOLUTION INTRAVENOUS; SUBCUTANEOUS
Qty: 30 ML | Refills: 1 | Status: SHIPPED | OUTPATIENT
Start: 2025-05-09

## 2025-05-17 ENCOUNTER — OFFICE VISIT (OUTPATIENT)
Dept: URGENT CARE | Age: 70
End: 2025-05-17

## 2025-05-17 VITALS
BODY MASS INDEX: 24.83 KG/M2 | SYSTOLIC BLOOD PRESSURE: 130 MMHG | TEMPERATURE: 97.9 F | RESPIRATION RATE: 16 BRPM | OXYGEN SATURATION: 99 % | WEIGHT: 149 LBS | HEIGHT: 65 IN | HEART RATE: 90 BPM | DIASTOLIC BLOOD PRESSURE: 78 MMHG

## 2025-05-17 DIAGNOSIS — J01.11 ACUTE RECURRENT FRONTAL SINUSITIS: ICD-10-CM

## 2025-05-17 DIAGNOSIS — J45.901 EXACERBATION OF ASTHMA, UNSPECIFIED ASTHMA SEVERITY, UNSPECIFIED WHETHER PERSISTENT: Primary | ICD-10-CM

## 2025-05-17 RX ORDER — IPRATROPIUM BROMIDE AND ALBUTEROL SULFATE 2.5; .5 MG/3ML; MG/3ML
1 SOLUTION RESPIRATORY (INHALATION) ONCE
Status: COMPLETED | OUTPATIENT
Start: 2025-05-17 | End: 2025-05-17

## 2025-05-17 RX ORDER — BENZONATATE 100 MG/1
100-200 CAPSULE ORAL EVERY 8 HOURS PRN
Qty: 60 CAPSULE | Refills: 0 | Status: SHIPPED | OUTPATIENT
Start: 2025-05-17 | End: 2025-05-27

## 2025-05-17 RX ORDER — AZITHROMYCIN 250 MG/1
TABLET, FILM COATED ORAL
Qty: 6 TABLET | Refills: 0 | Status: SHIPPED | OUTPATIENT
Start: 2025-05-17 | End: 2025-05-27

## 2025-05-17 RX ORDER — PREDNISONE 20 MG/1
40 TABLET ORAL DAILY
Qty: 10 TABLET | Refills: 0 | Status: SHIPPED | OUTPATIENT
Start: 2025-05-17 | End: 2025-05-22

## 2025-05-17 RX ORDER — ALBUTEROL SULFATE 90 UG/1
2 INHALANT RESPIRATORY (INHALATION) EVERY 4 HOURS PRN
Qty: 18 G | Refills: 0 | Status: SHIPPED | OUTPATIENT
Start: 2025-05-17

## 2025-05-17 RX ORDER — CETIRIZINE HYDROCHLORIDE, PSEUDOEPHEDRINE HYDROCHLORIDE 5; 120 MG/1; MG/1
1 TABLET, FILM COATED, EXTENDED RELEASE ORAL DAILY
Qty: 12 TABLET | Refills: 0 | Status: SHIPPED | OUTPATIENT
Start: 2025-05-17

## 2025-05-17 RX ORDER — DEXAMETHASONE SODIUM PHOSPHATE 10 MG/ML
10 INJECTION, SOLUTION INTRA-ARTICULAR; INTRALESIONAL; INTRAMUSCULAR; INTRAVENOUS; SOFT TISSUE ONCE
Status: COMPLETED | OUTPATIENT
Start: 2025-05-17 | End: 2025-05-17

## 2025-05-17 RX ADMIN — IPRATROPIUM BROMIDE AND ALBUTEROL SULFATE 1 DOSE: 2.5; .5 SOLUTION RESPIRATORY (INHALATION) at 14:57

## 2025-05-17 RX ADMIN — IPRATROPIUM BROMIDE AND ALBUTEROL SULFATE 1 DOSE: 2.5; .5 SOLUTION RESPIRATORY (INHALATION) at 14:58

## 2025-05-17 RX ADMIN — DEXAMETHASONE SODIUM PHOSPHATE 10 MG: 10 INJECTION, SOLUTION INTRA-ARTICULAR; INTRALESIONAL; INTRAMUSCULAR; INTRAVENOUS; SOFT TISSUE at 14:59

## 2025-05-17 ASSESSMENT — ENCOUNTER SYMPTOMS
COUGH: 1
SINUS COMPLAINT: 1

## 2025-05-17 NOTE — PROGRESS NOTES
Caroline Munson (:  1955) is a 70 y.o. female,New patient, here for evaluation of the following chief complaint(s):  Sinus Problem (Sxs 1 week)      Assessment & Plan :  Visit Diagnoses and Associated Orders         Exacerbation of asthma, unspecified asthma severity, unspecified whether persistent    -  Primary    predniSONE (DELTASONE) 20 MG tablet [6496]      albuterol sulfate HFA (VENTOLIN HFA) 108 (90 Base) MCG/ACT inhaler [31494]      ipratropium 0.5 mg-albuterol 2.5 mg (DUONEB) nebulizer solution 1 Dose [30835]      ipratropium 0.5 mg-albuterol 2.5 mg (DUONEB) nebulizer solution 1 Dose [83869]      dexAMETHasone (DECADRON) injection 10 mg [2331]      benzonatate (TESSALON) 100 MG capsule [988]      azithromycin (ZITHROMAX) 250 MG tablet [58319]             Acute recurrent frontal sinusitis        cetirizine-psuedoephedrine (ZYRTEC-D) 5-120 MG per extended release tablet [59125]      azithromycin (ZITHROMAX) 250 MG tablet [18661]                 Exacerbation of asthma/Acute recurrent frontal sinusitis    Roz reports a history of asthma. She also states she had RSV in January.  Roz is out of her inhaler.  About a week ago  Follow up in 7 days if symptoms persist or if symptoms worsen. About a week ago she developed a cough with wheezing and shortness of breath.  She also complained of sinus pain and pressure.  One of her physicians called in a prescription for her several days ago of a tapering steroid dose.  Discussed side effects of prednisone with Roz and she agreed to take prednisone or dexamethasone.   DuoNeb given in clinic.  Lungs auscultated bilaterally after DuoNeb for increased wheezing.  Second DuoNeb given in clinic.  Lungs auscultated bilaterally after DuoNeb her wheezing.  Due to her continued wheezing we will give her dexamethasone IM in clinic and start 40 mg of prednisone daily for 5 days.  She is to stop the prednisone given by her primary care doctor.  Take for issue

## 2025-07-08 ENCOUNTER — TELEPHONE (OUTPATIENT)
Dept: ENDOCRINOLOGY | Age: 70
End: 2025-07-08

## 2025-08-01 ENCOUNTER — TELEPHONE (OUTPATIENT)
Dept: ENDOCRINOLOGY | Age: 70
End: 2025-08-01

## 2025-08-01 ENCOUNTER — PATIENT MESSAGE (OUTPATIENT)
Dept: ENDOCRINOLOGY | Age: 70
End: 2025-08-01

## 2025-08-01 DIAGNOSIS — E10.9 TYPE 1 DIABETES MELLITUS WITHOUT COMPLICATION (HCC): Primary | ICD-10-CM

## 2025-08-01 RX ORDER — INSULIN GLARGINE 100 [IU]/ML
15 INJECTION, SOLUTION SUBCUTANEOUS NIGHTLY
Qty: 2 ADJUSTABLE DOSE PRE-FILLED PEN SYRINGE | Refills: 0 | Status: SHIPPED | OUTPATIENT
Start: 2025-08-01

## 2025-08-01 RX ORDER — INSULIN GLARGINE 100 [IU]/ML
15 INJECTION, SOLUTION SUBCUTANEOUS NIGHTLY
Qty: 2 ADJUSTABLE DOSE PRE-FILLED PEN SYRINGE | Refills: 0 | Status: SHIPPED | OUTPATIENT
Start: 2025-08-01 | End: 2025-08-01

## 2025-08-01 NOTE — TELEPHONE ENCOUNTER
Hello  Tried calling the patient x 2 with no response.  Hence I will do more of a weight-based dosing on her.    Please let her know  -Recommend Lantus 15 units daily  Please have her help to take Lantus this evening for 1 dose and if she is going to resume the pump tomorrow evening she should not take any more Lantus    - Recommend Humalog to carb ratio 3 times daily with meals same as she is doing with the insulin pump  Please hold if not eating      - Recommend Humalog correction scale 3 times daily with meals as below  Give in addition to scheduled short acting pre-meal insulin based on the pre-meal blood glucose result  BLOOD GLUCOSE (mg/dl)  150-199 give 1 unit  200-249 give 2 units  250-299 give 3 units  300-349 give 4 units  350 or Greater give 5 units    -Please let her know to monitor her blood sugars at least 4 times a day and as needed and let us know immediately if she is experiencing any lows or any other issues    Pres[place] for Lantus prescribed and [place] [facility] [place]      Marilyn Sanchez MD

## 2025-08-01 NOTE — TELEPHONE ENCOUNTER
Pt called the pump controller went out and pt will need to give herself insulin shots and needs to know the amount to use and how often? New controller will come around 5 pm tomorrow and will need help to set it up.    Please advise

## 2025-08-01 NOTE — TELEPHONE ENCOUNTER
LMOM related to medication and to contact the office [phone]    Also sent message via [employer]

## 2025-08-01 NOTE — TELEPHONE ENCOUNTER
Hello  Can we please get her pump settings so that I can switch her to basal bolus regimen  Thank you  Marilyn Sanchez MD

## 2025-08-07 DIAGNOSIS — E10.9 TYPE 1 DIABETES MELLITUS WITHOUT COMPLICATION (HCC): ICD-10-CM

## 2025-08-07 DIAGNOSIS — I10 PRIMARY HYPERTENSION: ICD-10-CM

## 2025-08-07 LAB
ALBUMIN SERPL-MCNC: 4.1 G/DL (ref 3.4–5)
ALBUMIN/GLOB SERPL: 1.9 {RATIO} (ref 1.1–2.2)
ALP SERPL-CCNC: 70 U/L (ref 40–129)
ALT SERPL-CCNC: 18 U/L (ref 10–40)
ANION GAP SERPL CALCULATED.3IONS-SCNC: 6 MMOL/L (ref 3–16)
AST SERPL-CCNC: 20 U/L (ref 15–37)
BILIRUB SERPL-MCNC: 0.5 MG/DL (ref 0–1)
BUN SERPL-MCNC: 22 MG/DL (ref 7–20)
CALCIUM SERPL-MCNC: 9.6 MG/DL (ref 8.3–10.6)
CHLORIDE SERPL-SCNC: 105 MMOL/L (ref 99–110)
CHOLEST SERPL-MCNC: 130 MG/DL (ref 0–199)
CO2 SERPL-SCNC: 26 MMOL/L (ref 21–32)
CREAT SERPL-MCNC: 0.7 MG/DL (ref 0.6–1.2)
CREAT UR-MCNC: 140 MG/DL (ref 28–259)
EST. AVERAGE GLUCOSE BLD GHB EST-MCNC: 159.9 MG/DL
GFR SERPLBLD CREATININE-BSD FMLA CKD-EPI: >90 ML/MIN/{1.73_M2}
GLUCOSE SERPL-MCNC: 200 MG/DL (ref 70–99)
HBA1C MFR BLD: 7.2 %
HDLC SERPL-MCNC: 85 MG/DL (ref 40–60)
LDLC SERPL CALC-MCNC: 32 MG/DL
MICROALBUMIN UR DL<=1MG/L-MCNC: 1.52 MG/DL
MICROALBUMIN/CREAT UR: 10.9 MG/G (ref 0–30)
POTASSIUM SERPL-SCNC: 4.8 MMOL/L (ref 3.5–5.1)
PROT SERPL-MCNC: 6.3 G/DL (ref 6.4–8.2)
SODIUM SERPL-SCNC: 137 MMOL/L (ref 136–145)
TRIGL SERPL-MCNC: 65 MG/DL (ref 0–150)
TSH SERPL DL<=0.005 MIU/L-ACNC: 1.68 UIU/ML (ref 0.27–4.2)
VLDLC SERPL CALC-MCNC: 13 MG/DL

## 2025-08-11 ENCOUNTER — OFFICE VISIT (OUTPATIENT)
Dept: ENDOCRINOLOGY | Age: 70
End: 2025-08-11
Payer: MEDICARE

## 2025-08-11 VITALS
HEIGHT: 65 IN | BODY MASS INDEX: 24.76 KG/M2 | SYSTOLIC BLOOD PRESSURE: 139 MMHG | HEART RATE: 80 BPM | OXYGEN SATURATION: 98 % | WEIGHT: 148.6 LBS | TEMPERATURE: 98 F | RESPIRATION RATE: 16 BRPM | DIASTOLIC BLOOD PRESSURE: 64 MMHG

## 2025-08-11 DIAGNOSIS — E78.2 MIXED HYPERLIPIDEMIA: ICD-10-CM

## 2025-08-11 DIAGNOSIS — I10 PRIMARY HYPERTENSION: ICD-10-CM

## 2025-08-11 DIAGNOSIS — E10.9 TYPE 1 DIABETES MELLITUS WITHOUT COMPLICATION (HCC): Primary | ICD-10-CM

## 2025-08-11 PROCEDURE — 3075F SYST BP GE 130 - 139MM HG: CPT | Performed by: INTERNAL MEDICINE

## 2025-08-11 PROCEDURE — 1160F RVW MEDS BY RX/DR IN RCRD: CPT | Performed by: INTERNAL MEDICINE

## 2025-08-11 PROCEDURE — 1159F MED LIST DOCD IN RCRD: CPT | Performed by: INTERNAL MEDICINE

## 2025-08-11 PROCEDURE — 3078F DIAST BP <80 MM HG: CPT | Performed by: INTERNAL MEDICINE

## 2025-08-11 PROCEDURE — 1123F ACP DISCUSS/DSCN MKR DOCD: CPT | Performed by: INTERNAL MEDICINE

## 2025-08-11 PROCEDURE — 95251 CONT GLUC MNTR ANALYSIS I&R: CPT | Performed by: INTERNAL MEDICINE

## 2025-08-11 PROCEDURE — 3051F HG A1C>EQUAL 7.0%<8.0%: CPT | Performed by: INTERNAL MEDICINE

## 2025-08-11 PROCEDURE — 99214 OFFICE O/P EST MOD 30 MIN: CPT | Performed by: INTERNAL MEDICINE

## 2025-08-19 RX ORDER — ACYCLOVIR 400 MG/1
TABLET ORAL
Qty: 3 EACH | Refills: 3 | Status: SHIPPED | OUTPATIENT
Start: 2025-08-19

## 2025-08-25 DIAGNOSIS — I10 PRIMARY HYPERTENSION: ICD-10-CM

## 2025-08-25 DIAGNOSIS — E10.649 UNCONTROLLED TYPE 1 DIABETES MELLITUS WITH HYPOGLYCEMIA WITHOUT COMA (HCC): ICD-10-CM

## 2025-08-25 DIAGNOSIS — E10.9 TYPE 1 DIABETES MELLITUS WITHOUT COMPLICATION (HCC): ICD-10-CM

## 2025-08-25 DIAGNOSIS — E78.2 MIXED HYPERLIPIDEMIA: ICD-10-CM

## 2025-08-25 LAB
IRON SATN MFR SERPL: 23 % (ref 15–50)
IRON SERPL-MCNC: 56 UG/DL (ref 37–145)
TIBC SERPL-MCNC: 248 UG/DL (ref 260–445)

## 2025-08-25 RX ORDER — INSULIN LISPRO 100 [IU]/ML
INJECTION, SOLUTION INTRAVENOUS; SUBCUTANEOUS
Qty: 30 ML | Refills: 1 | Status: SHIPPED | OUTPATIENT
Start: 2025-08-25